# Patient Record
Sex: FEMALE | Race: WHITE | NOT HISPANIC OR LATINO | URBAN - METROPOLITAN AREA
[De-identification: names, ages, dates, MRNs, and addresses within clinical notes are randomized per-mention and may not be internally consistent; named-entity substitution may affect disease eponyms.]

---

## 2017-11-26 ENCOUNTER — INPATIENT (INPATIENT)
Facility: HOSPITAL | Age: 47
LOS: 0 days | Discharge: HOME | End: 2017-11-27
Attending: INTERNAL MEDICINE | Admitting: INTERNAL MEDICINE

## 2017-11-26 DIAGNOSIS — M46.1 SACROILIITIS, NOT ELSEWHERE CLASSIFIED: ICD-10-CM

## 2017-11-26 DIAGNOSIS — R07.9 CHEST PAIN, UNSPECIFIED: ICD-10-CM

## 2017-11-26 DIAGNOSIS — R19.7 DIARRHEA, UNSPECIFIED: ICD-10-CM

## 2017-11-26 DIAGNOSIS — I10 ESSENTIAL (PRIMARY) HYPERTENSION: ICD-10-CM

## 2017-11-26 DIAGNOSIS — K57.32 DIVERTICULITIS OF LARGE INTESTINE WITHOUT PERFORATION OR ABSCESS WITHOUT BLEEDING: ICD-10-CM

## 2017-12-01 DIAGNOSIS — M54.9 DORSALGIA, UNSPECIFIED: ICD-10-CM

## 2017-12-01 DIAGNOSIS — H40.9 UNSPECIFIED GLAUCOMA: ICD-10-CM

## 2017-12-01 DIAGNOSIS — R07.9 CHEST PAIN, UNSPECIFIED: ICD-10-CM

## 2017-12-01 DIAGNOSIS — G89.29 OTHER CHRONIC PAIN: ICD-10-CM

## 2017-12-01 DIAGNOSIS — I10 ESSENTIAL (PRIMARY) HYPERTENSION: ICD-10-CM

## 2017-12-01 DIAGNOSIS — R07.89 OTHER CHEST PAIN: ICD-10-CM

## 2017-12-01 DIAGNOSIS — G35 MULTIPLE SCLEROSIS: ICD-10-CM

## 2018-08-02 ENCOUNTER — EMERGENCY (EMERGENCY)
Facility: HOSPITAL | Age: 48
LOS: 0 days | Discharge: HOME | End: 2018-08-02
Attending: EMERGENCY MEDICINE | Admitting: EMERGENCY MEDICINE

## 2018-08-02 VITALS
RESPIRATION RATE: 18 BRPM | TEMPERATURE: 98 F | SYSTOLIC BLOOD PRESSURE: 132 MMHG | OXYGEN SATURATION: 97 % | DIASTOLIC BLOOD PRESSURE: 70 MMHG | HEART RATE: 82 BPM

## 2018-08-02 VITALS
WEIGHT: 184.97 LBS | HEIGHT: 65 IN | OXYGEN SATURATION: 96 % | RESPIRATION RATE: 19 BRPM | HEART RATE: 74 BPM | SYSTOLIC BLOOD PRESSURE: 149 MMHG | DIASTOLIC BLOOD PRESSURE: 79 MMHG | TEMPERATURE: 97 F

## 2018-08-02 DIAGNOSIS — Z98.1 ARTHRODESIS STATUS: ICD-10-CM

## 2018-08-02 DIAGNOSIS — M25.552 PAIN IN LEFT HIP: ICD-10-CM

## 2018-08-02 DIAGNOSIS — M25.559 PAIN IN UNSPECIFIED HIP: ICD-10-CM

## 2018-08-02 DIAGNOSIS — G89.29 OTHER CHRONIC PAIN: ICD-10-CM

## 2018-08-02 DIAGNOSIS — M53.3 SACROCOCCYGEAL DISORDERS, NOT ELSEWHERE CLASSIFIED: ICD-10-CM

## 2018-08-02 DIAGNOSIS — Z88.6 ALLERGY STATUS TO ANALGESIC AGENT: ICD-10-CM

## 2018-08-02 DIAGNOSIS — Z86.69 PERSONAL HISTORY OF OTHER DISEASES OF THE NERVOUS SYSTEM AND SENSE ORGANS: ICD-10-CM

## 2018-08-02 DIAGNOSIS — Z88.2 ALLERGY STATUS TO SULFONAMIDES: ICD-10-CM

## 2018-08-02 DIAGNOSIS — Z79.899 OTHER LONG TERM (CURRENT) DRUG THERAPY: ICD-10-CM

## 2018-08-02 DIAGNOSIS — I10 ESSENTIAL (PRIMARY) HYPERTENSION: ICD-10-CM

## 2018-08-02 RX ORDER — ACETAMINOPHEN 500 MG
975 TABLET ORAL ONCE
Qty: 0 | Refills: 0 | Status: COMPLETED | OUTPATIENT
Start: 2018-08-02 | End: 2018-08-02

## 2018-08-02 NOTE — ED PROVIDER NOTE - PHYSICAL EXAMINATION
CONSTITUTIONAL: Well-developed; well-nourished; in no acute distress.   SKIN: warm, dry  CARD: S1, S2 normal; no murmurs, gallops, or rubs. Regular rate and rhythm.   RESP: No wheezes, rales or rhonchi.  ABD: soft ntnd  EXT: +tenderness to left SI joint, limited ROM at hip due to pain   NEURO: Alert, oriented, grossly unremarkable; neurovascularly intact   PSYCH: Cooperative, appropriate.

## 2018-08-02 NOTE — ED PROVIDER NOTE - ATTENDING CONTRIBUTION TO CARE
47 yo F with history of HTN, MD, spinal and SI joint fusion here for gradual worsening of chronic L hip and back pain. No new weakness, pareshtesias, urinary or stool sx.    Has unremarkable exam without signs of acute cord or midline injury, no focal neuro deficits -- will get XR and reassess.

## 2018-08-02 NOTE — ED PROVIDER NOTE - NS ED ROS FT
Constitutional: See HPI.  Eyes: No visual changes, eye pain or discharge.  ENMT: No hearing changes, pain, discharge or infections. No neck pain or stiffness.  Cardiac: No chest pain, SOB or edema. No chest pain with exertion.  Respiratory: No cough or respiratory distress.   MS: +left hip pain; No myalgia, muscle weakness.  Neuro: No headache or weakness. No LOC.  Skin: No skin rash.

## 2018-08-02 NOTE — ED ADULT TRIAGE NOTE - CHIEF COMPLAINT QUOTE
patient complaining of pain in her left hip, S/P S-I fusion 4 years ago, patient denies any recent injuries to area. pain began intermittently since "last couple of years" but worsened today

## 2018-08-02 NOTE — ED PROVIDER NOTE - OBJECTIVE STATEMENT
47 y/o female with pmhx of HTN, MS, SI fusion presents with left hip pain. Patient states she had SI fusion surgery 3 years ago; has chronic back pain and hip pain however for the past couple months the pain has been worsening. Patient states today, she was at the grocery store, placing something on the counter when she had sharp pain to left hip, able to ambulate but with difficulty due to pain. Denies trauma, numbness/tingling, paresthesias, weakness. Orthopedist is at [facility].

## 2018-08-02 NOTE — ED PROVIDER NOTE - MEDICAL DECISION MAKING DETAILS
see attending note, harware intact, unchanged from previous, will need MRI as outpatient, advised on results and PMD follow up

## 2019-11-17 ENCOUNTER — EMERGENCY (EMERGENCY)
Facility: HOSPITAL | Age: 49
LOS: 0 days | Discharge: HOME | End: 2019-11-18
Attending: EMERGENCY MEDICINE | Admitting: EMERGENCY MEDICINE
Payer: COMMERCIAL

## 2019-11-17 VITALS
WEIGHT: 184.97 LBS | HEART RATE: 103 BPM | DIASTOLIC BLOOD PRESSURE: 78 MMHG | HEIGHT: 65 IN | OXYGEN SATURATION: 97 % | TEMPERATURE: 97 F | RESPIRATION RATE: 20 BRPM | SYSTOLIC BLOOD PRESSURE: 151 MMHG

## 2019-11-17 DIAGNOSIS — R23.2 FLUSHING: ICD-10-CM

## 2019-11-17 DIAGNOSIS — Z88.5 ALLERGY STATUS TO NARCOTIC AGENT: ICD-10-CM

## 2019-11-17 DIAGNOSIS — R00.2 PALPITATIONS: ICD-10-CM

## 2019-11-17 DIAGNOSIS — Z88.2 ALLERGY STATUS TO SULFONAMIDES: ICD-10-CM

## 2019-11-17 DIAGNOSIS — E83.42 HYPOMAGNESEMIA: ICD-10-CM

## 2019-11-17 DIAGNOSIS — I10 ESSENTIAL (PRIMARY) HYPERTENSION: ICD-10-CM

## 2019-11-17 DIAGNOSIS — T40.2X5A ADVERSE EFFECT OF OTHER OPIOIDS, INITIAL ENCOUNTER: ICD-10-CM

## 2019-11-17 PROCEDURE — 99285 EMERGENCY DEPT VISIT HI MDM: CPT

## 2019-11-17 RX ORDER — SODIUM CHLORIDE 9 MG/ML
1000 INJECTION INTRAMUSCULAR; INTRAVENOUS; SUBCUTANEOUS ONCE
Refills: 0 | Status: COMPLETED | OUTPATIENT
Start: 2019-11-17 | End: 2019-11-17

## 2019-11-17 NOTE — ED ADULT TRIAGE NOTE - CHIEF COMPLAINT QUOTE
Pt states she took 2 of Nucynta with in a 2 period hour and states she does not "feel right".  Face is flushed and c/o, dry mouth and nausea.

## 2019-11-18 VITALS
RESPIRATION RATE: 17 BRPM | DIASTOLIC BLOOD PRESSURE: 85 MMHG | SYSTOLIC BLOOD PRESSURE: 134 MMHG | HEART RATE: 96 BPM | OXYGEN SATURATION: 98 %

## 2019-11-18 PROBLEM — I10 ESSENTIAL (PRIMARY) HYPERTENSION: Chronic | Status: ACTIVE | Noted: 2018-08-02

## 2019-11-18 PROBLEM — G35 MULTIPLE SCLEROSIS: Chronic | Status: ACTIVE | Noted: 2018-08-02

## 2019-11-18 LAB
ALBUMIN SERPL ELPH-MCNC: 4.5 G/DL — SIGNIFICANT CHANGE UP (ref 3.5–5.2)
ALP SERPL-CCNC: 58 U/L — SIGNIFICANT CHANGE UP (ref 30–115)
ALT FLD-CCNC: 15 U/L — SIGNIFICANT CHANGE UP (ref 0–41)
ANION GAP SERPL CALC-SCNC: 12 MMOL/L — SIGNIFICANT CHANGE UP (ref 7–14)
AST SERPL-CCNC: 38 U/L — SIGNIFICANT CHANGE UP (ref 0–41)
BASOPHILS # BLD AUTO: 0.04 K/UL — SIGNIFICANT CHANGE UP (ref 0–0.2)
BASOPHILS NFR BLD AUTO: 0.4 % — SIGNIFICANT CHANGE UP (ref 0–1)
BILIRUB SERPL-MCNC: 0.7 MG/DL — SIGNIFICANT CHANGE UP (ref 0.2–1.2)
BUN SERPL-MCNC: 9 MG/DL — LOW (ref 10–20)
CALCIUM SERPL-MCNC: 9.7 MG/DL — SIGNIFICANT CHANGE UP (ref 8.5–10.1)
CHLORIDE SERPL-SCNC: 98 MMOL/L — SIGNIFICANT CHANGE UP (ref 98–110)
CO2 SERPL-SCNC: 23 MMOL/L — SIGNIFICANT CHANGE UP (ref 17–32)
CREAT SERPL-MCNC: 0.6 MG/DL — LOW (ref 0.7–1.5)
EOSINOPHIL # BLD AUTO: 0.03 K/UL — SIGNIFICANT CHANGE UP (ref 0–0.7)
EOSINOPHIL NFR BLD AUTO: 0.3 % — SIGNIFICANT CHANGE UP (ref 0–8)
GLUCOSE SERPL-MCNC: 154 MG/DL — HIGH (ref 70–99)
HCG SERPL QL: NEGATIVE — SIGNIFICANT CHANGE UP
HCT VFR BLD CALC: 44.6 % — SIGNIFICANT CHANGE UP (ref 37–47)
HGB BLD-MCNC: 15.4 G/DL — SIGNIFICANT CHANGE UP (ref 12–16)
IMM GRANULOCYTES NFR BLD AUTO: 0.5 % — HIGH (ref 0.1–0.3)
LYMPHOCYTES # BLD AUTO: 1.69 K/UL — SIGNIFICANT CHANGE UP (ref 1.2–3.4)
LYMPHOCYTES # BLD AUTO: 15.7 % — LOW (ref 20.5–51.1)
MAGNESIUM SERPL-MCNC: 1.7 MG/DL — LOW (ref 1.8–2.4)
MCHC RBC-ENTMCNC: 30.6 PG — SIGNIFICANT CHANGE UP (ref 27–31)
MCHC RBC-ENTMCNC: 34.5 G/DL — SIGNIFICANT CHANGE UP (ref 32–37)
MCV RBC AUTO: 88.7 FL — SIGNIFICANT CHANGE UP (ref 81–99)
MONOCYTES # BLD AUTO: 0.74 K/UL — HIGH (ref 0.1–0.6)
MONOCYTES NFR BLD AUTO: 6.9 % — SIGNIFICANT CHANGE UP (ref 1.7–9.3)
NEUTROPHILS # BLD AUTO: 8.24 K/UL — HIGH (ref 1.4–6.5)
NEUTROPHILS NFR BLD AUTO: 76.2 % — HIGH (ref 42.2–75.2)
NRBC # BLD: 0 /100 WBCS — SIGNIFICANT CHANGE UP (ref 0–0)
NT-PROBNP SERPL-SCNC: 16 PG/ML — SIGNIFICANT CHANGE UP (ref 0–300)
PLATELET # BLD AUTO: 294 K/UL — SIGNIFICANT CHANGE UP (ref 130–400)
POTASSIUM SERPL-MCNC: 5.4 MMOL/L — HIGH (ref 3.5–5)
POTASSIUM SERPL-SCNC: 5.4 MMOL/L — HIGH (ref 3.5–5)
PROT SERPL-MCNC: 7.6 G/DL — SIGNIFICANT CHANGE UP (ref 6–8)
RBC # BLD: 5.03 M/UL — SIGNIFICANT CHANGE UP (ref 4.2–5.4)
RBC # FLD: 13.8 % — SIGNIFICANT CHANGE UP (ref 11.5–14.5)
SODIUM SERPL-SCNC: 133 MMOL/L — LOW (ref 135–146)
TROPONIN T SERPL-MCNC: <0.01 NG/ML — SIGNIFICANT CHANGE UP
WBC # BLD: 10.79 K/UL — SIGNIFICANT CHANGE UP (ref 4.8–10.8)
WBC # FLD AUTO: 10.79 K/UL — SIGNIFICANT CHANGE UP (ref 4.8–10.8)

## 2019-11-18 PROCEDURE — 71046 X-RAY EXAM CHEST 2 VIEWS: CPT | Mod: 26

## 2019-11-18 NOTE — ED PROVIDER NOTE - OBJECTIVE STATEMENT
50 y/o F with PMH MS, back surgery on Nucynta, presents with flushing/palpitations s/p taking a double dose of Nucynta, which she has never done before. no cp/sob. no palliating/provoking factors. has been on this med x 3 years.

## 2019-11-18 NOTE — ED PROVIDER NOTE - NSFOLLOWUPINSTRUCTIONS_ED_ALL_ED_FT
**** YOU HAVE LOW MAGNESIUM--- EAT FOODS LIKE BEANS, LEAFY GREENS, NUTS, SEEDS AND WHOLE GRAINS TO HELP BOOST YOUR MAGNESIUM LEVEL********         Drug Allergy  A drug allergy is when your body reacts in a bad way to a medicine. The reaction may be mild or very bad. In some cases, it can be life-threatening.  If you have an allergic reaction, get help right away. You should get help even if the reaction seems mild.  What are the causes?  This condition is caused by a reaction in your body's defense system (immune system). The system sees a medicine as being harmful when it is not.  What are the signs or symptoms?  Symptoms of a mild reaction     A stuffy nose (nasal congestion).Tingling in your mouth.An itchy, red rash.Symptoms of a very bad reaction     Swelling of your eyes, lips, face, or tongue.Swelling of the back of your mouth and your throat.Breathing loudly (wheezing).A hoarse voice.Itchy, red, swollen areas of skin (hives).Feeling dizzy or light-headed.Passing out (fainting).Feeling worried or nervous (anxiety).Feeling mixed up (confused).Pain in your belly (abdomen).Trouble with breathing, talking, or swallowing.A tight feeling in your chest.Fast or uneven heartbeats (palpitations).Throwing up (vomiting).Watery poop (diarrhea).How is this treated?  There is no cure for allergies. An allergic reaction can be treated with:  Medicines to help your symptoms.Medicines that you breathe into your lungs (respiratory inhalers).An allergy shot (epinephrine injection).For a very bad reaction, you may need to stay in the hospital. Your doctor may teach you how to use an allergy kit (anaphylaxis kit) and how to give yourself an allergy shot. You can give yourself an allergy shot with what is called an auto-injector "pen."  Follow these instructions at home:  If you have a very bad allergy:     Image   Always keep an auto-injector pen or your allergy kit with you. These could save your life. Use them as told by your doctor.Make sure that you, the people who live with you, and your employer know:  How to use your allergy kit.How to use an auto-injector pen to give you an allergy shot.If you used your auto-injector pen:  Get more medicine for it right away. This is important in case you have another reaction.Get help right away.Wear a medical alert bracelet or necklace that says you have an allergy, if your doctor tells you to do this.General instructions     Avoid medicines that you are allergic to.Take over-the-counter and prescription medicines only as told by your doctor.Do not drive until your doctor says it is safe.If you have itchy, red, swollen areas of skin or a rash:  Use an over-the-counter medicine (antihistamine) as told by your doctor.Put cold, wet cloths (cold compresses) on your skin.Take baths or showers in cool water. Avoid hot water.If you had tests done, it is up to you to get your test results. Ask your doctor when your results will be ready.Tell any doctors who care for you that you have a drug allergy.Keep all follow-up visits as told by your doctor. This is important.Contact a doctor if:  You think that you are having a mild allergic reaction.You have symptoms that last more than 2 days after your reaction.Your symptoms get worse.You get new symptoms.Get help right away if:  You had to use your auto-injector pen. You must go to the emergency room, even if the medicine seems to be working.You have symptoms of a very bad allergic reaction.These symptoms may be an emergency. Do not wait to see if the symptoms will go away. Use your auto-injector pen or allergy kit as you have been told. Get medical help right away. Call your local emergency services (911 in the [place] Do not drive yourself to the hospital.   Summary  A drug allergy is when your body reacts in a bad way to a medicine.Take medicines only as told by your doctor.Tell any doctors who care for you that you have a drug allergy.Always keep an auto-injector pen or your allergy kit with you if you have a very bad allergy.This information is not intended to replace advice given to you by your health care provider. Make sure you discuss any questions you have with your health care provider.

## 2019-11-18 NOTE — ED PROVIDER NOTE - PATIENT PORTAL LINK FT
You can access the FollowMyHealth Patient Portal offered by [facility] by registering at the following website: [email or web address]. By joining [facility]’s FollowMyHealth portal, you will also be able to view your health information using other applications (apps) compatible with our system.

## 2019-11-18 NOTE — ED PROVIDER NOTE - ATTENDING CONTRIBUTION TO CARE
49 y F PMH MS, prior back surgery previously with adverse reactions to other PO opiates and now on Nucynta, pw flushing, palpitations, and lower anterior neck discomfort, all starting approx 1 hour PTA, after taking 2 tablets of the Nucynta close together. Has never done this before.   Exam: NAD, NCAT, HEENT: mmm, EOMI, PERRLA, Neck: supple, nontender, nl ROM, Heart: RRR, no murmur, Lungs: BCTA, no signs of increased WOB, Abd: NTND, no guarding or rebound, no hernia palpated, no CVAT. Skin: No urticaria, mild diffuse facial and neck flushing as compared to baseline per patient. MSK: chest, back, and ext nontender, nl rom, no deformity. Neuro: A&Ox3, CN II-XII intact, normal strength 5/5 all aspects of b/l UE and LE, no drift of b/l UE and LE, nl sensation throughout all 4 ext, normal speech, gait, and coordination.   A/P: Likely medication adverse reaction, symptoms listed among known adverse reactions. Eval ACS given location of discomfort and palpitations. No sign of upper airway obstruction or increased WOB to suggest allergic reaction.

## 2019-11-18 NOTE — ED PROVIDER NOTE - NS ED ROS FT
Review of Systems    Constitutional: (-) fever   Eyes/ENT: (-) vision changes  Cardiovascular: (-) chest pain, (-) syncope (+) palpitations  Respiratory: (-) cough, (-) shortness of breath  Gastrointestinal: (-) vomiting, (-) diarrhea  (-) abdominal pain  Genitourinary:  (-) dysuria   Musculoskeletal: (-) neck pain, (-) back pain, (-) leg pain/swelling  Integumentary: (-) rash, (-) edema  Neurological: (-) headache  Hematologic: (-) easy bruising   Allergic/Immunologic: (-) pruritus

## 2019-11-18 NOTE — ED PROVIDER NOTE - PHYSICAL EXAMINATION
PHYSICAL EXAM:    GENERAL: Alert, appears stated age, well appearing, non-toxic  SKIN: Warm, pink and dry. MMM. +cheeks flushed    EYE: Normal lids/conjunctiva  ENT: Normal hearing, patent oropharynx   NECK: +supple. No meningismus, or JVDffs.   Pulm: Bilateral BS, normal resp effort, no wheezes, stridor, or retractions  CV: RRR, no M/R/G, 2+and = radial pulses  Abd: soft, non-tender, non-distended  Mskel: no erythema, cyanosis, edema. no calf tenderness  Neuro: AAOx3, 5/5 strength throughout. normal gait.

## 2020-01-21 PROBLEM — Z00.00 ENCOUNTER FOR PREVENTIVE HEALTH EXAMINATION: Status: ACTIVE | Noted: 2020-01-21

## 2020-01-22 ENCOUNTER — APPOINTMENT (OUTPATIENT)
Dept: NEUROSURGERY | Facility: CLINIC | Age: 50
End: 2020-01-22
Payer: COMMERCIAL

## 2020-01-22 VITALS — WEIGHT: 180 LBS | HEIGHT: 65 IN | BODY MASS INDEX: 29.99 KG/M2

## 2020-01-22 DIAGNOSIS — Z83.3 FAMILY HISTORY OF DIABETES MELLITUS: ICD-10-CM

## 2020-01-22 DIAGNOSIS — Z86.69 PERSONAL HISTORY OF OTHER DISEASES OF THE NERVOUS SYSTEM AND SENSE ORGANS: ICD-10-CM

## 2020-01-22 DIAGNOSIS — Z78.9 OTHER SPECIFIED HEALTH STATUS: ICD-10-CM

## 2020-01-22 DIAGNOSIS — Z82.49 FAMILY HISTORY OF ISCHEMIC HEART DISEASE AND OTHER DISEASES OF THE CIRCULATORY SYSTEM: ICD-10-CM

## 2020-01-22 DIAGNOSIS — Z86.79 PERSONAL HISTORY OF OTHER DISEASES OF THE CIRCULATORY SYSTEM: ICD-10-CM

## 2020-01-22 PROCEDURE — 99204 OFFICE O/P NEW MOD 45 MIN: CPT

## 2020-01-22 RX ORDER — NIFEDIPINE 60 MG
60 TABLET, EXTENDED RELEASE 24 HR ORAL
Refills: 0 | Status: ACTIVE | COMMUNITY

## 2020-01-22 RX ORDER — METOPROLOL TARTRATE 50 MG/1
50 TABLET, FILM COATED ORAL
Refills: 0 | Status: ACTIVE | COMMUNITY

## 2020-01-22 RX ORDER — LISINOPRIL 30 MG/1
TABLET ORAL
Refills: 0 | Status: ACTIVE | COMMUNITY

## 2020-01-22 RX ORDER — TAPENTADOL HYDROCHLORIDE 100 MG/1
100 TABLET, FILM COATED ORAL
Refills: 0 | Status: ACTIVE | COMMUNITY

## 2020-01-22 RX ORDER — BIMATOPROST 0.1 MG/ML
SOLUTION/ DROPS OPHTHALMIC
Refills: 0 | Status: ACTIVE | COMMUNITY

## 2020-01-22 NOTE — REVIEW OF SYSTEMS
[Difficulty Writing] : difficulty writing [Tingling] : tingling [Leg Weakness] : leg weakness [Negative] : Endocrine

## 2020-01-27 NOTE — HISTORY OF PRESENT ILLNESS
[de-identified] : Ms. Chung has a history of chronic pain. She had a lumbar discectomy in the past. This was followed by lumbar fusion and then extension of her fusion. She also had a left SI joint fusion as well. Due to chronic lower back and left leg pain she has been medically managed on Nucynta. Recently she had a spinal cord stimulator trial with Dr. Davis using a [employer] stimulator. She notes 70% pain relief during the trial. She states she was able to discontinue the Nucynta during the trial as well. At this point she would like to proceed with permanent placement. On a side note she also has left hip pain. She was diagnoses with bursitis years ago. Injections in the past had helped her. She is scheduled to see Dr. Daisy Saxena at the end of the month to discuss her hip findings. She also has chronic weakness in the left leg. She denies bowel / bladder dysfunction.

## 2020-02-06 ENCOUNTER — OUTPATIENT (OUTPATIENT)
Dept: OUTPATIENT SERVICES | Facility: HOSPITAL | Age: 50
LOS: 1 days | Discharge: HOME | End: 2020-02-06
Payer: COMMERCIAL

## 2020-02-06 VITALS
HEIGHT: 65 IN | WEIGHT: 185.19 LBS | TEMPERATURE: 97 F | SYSTOLIC BLOOD PRESSURE: 168 MMHG | DIASTOLIC BLOOD PRESSURE: 96 MMHG | RESPIRATION RATE: 16 BRPM | OXYGEN SATURATION: 97 % | HEART RATE: 70 BPM

## 2020-02-06 DIAGNOSIS — G89.29 OTHER CHRONIC PAIN: ICD-10-CM

## 2020-02-06 DIAGNOSIS — Z01.818 ENCOUNTER FOR OTHER PREPROCEDURAL EXAMINATION: ICD-10-CM

## 2020-02-06 DIAGNOSIS — Z98.891 HISTORY OF UTERINE SCAR FROM PREVIOUS SURGERY: Chronic | ICD-10-CM

## 2020-02-06 DIAGNOSIS — L05.91 PILONIDAL CYST WITHOUT ABSCESS: Chronic | ICD-10-CM

## 2020-02-06 DIAGNOSIS — Z98.1 ARTHRODESIS STATUS: Chronic | ICD-10-CM

## 2020-02-06 DIAGNOSIS — Z98.890 OTHER SPECIFIED POSTPROCEDURAL STATES: Chronic | ICD-10-CM

## 2020-02-06 DIAGNOSIS — Z90.89 ACQUIRED ABSENCE OF OTHER ORGANS: Chronic | ICD-10-CM

## 2020-02-06 LAB
ALBUMIN SERPL ELPH-MCNC: 4.6 G/DL — SIGNIFICANT CHANGE UP (ref 3.5–5.2)
ALP SERPL-CCNC: 67 U/L — SIGNIFICANT CHANGE UP (ref 30–115)
ALT FLD-CCNC: 17 U/L — SIGNIFICANT CHANGE UP (ref 0–41)
ANION GAP SERPL CALC-SCNC: 15 MMOL/L — HIGH (ref 7–14)
APPEARANCE UR: CLEAR — SIGNIFICANT CHANGE UP
APTT BLD: 31.1 SEC — SIGNIFICANT CHANGE UP (ref 27–39.2)
AST SERPL-CCNC: 16 U/L — SIGNIFICANT CHANGE UP (ref 0–41)
BACTERIA # UR AUTO: NEGATIVE — SIGNIFICANT CHANGE UP
BASOPHILS # BLD AUTO: 0.06 K/UL — SIGNIFICANT CHANGE UP (ref 0–0.2)
BASOPHILS NFR BLD AUTO: 0.7 % — SIGNIFICANT CHANGE UP (ref 0–1)
BILIRUB SERPL-MCNC: 0.5 MG/DL — SIGNIFICANT CHANGE UP (ref 0.2–1.2)
BILIRUB UR-MCNC: NEGATIVE — SIGNIFICANT CHANGE UP
BLD GP AB SCN SERPL QL: SIGNIFICANT CHANGE UP
BUN SERPL-MCNC: 7 MG/DL — LOW (ref 10–20)
CALCIUM SERPL-MCNC: 9.5 MG/DL — SIGNIFICANT CHANGE UP (ref 8.5–10.1)
CHLORIDE SERPL-SCNC: 101 MMOL/L — SIGNIFICANT CHANGE UP (ref 98–110)
CO2 SERPL-SCNC: 24 MMOL/L — SIGNIFICANT CHANGE UP (ref 17–32)
COLOR SPEC: YELLOW — SIGNIFICANT CHANGE UP
CREAT SERPL-MCNC: 0.6 MG/DL — LOW (ref 0.7–1.5)
DIFF PNL FLD: NEGATIVE — SIGNIFICANT CHANGE UP
EOSINOPHIL # BLD AUTO: 0.07 K/UL — SIGNIFICANT CHANGE UP (ref 0–0.7)
EOSINOPHIL NFR BLD AUTO: 0.8 % — SIGNIFICANT CHANGE UP (ref 0–8)
EPI CELLS # UR: 3 /HPF — SIGNIFICANT CHANGE UP (ref 0–5)
GLUCOSE SERPL-MCNC: 91 MG/DL — SIGNIFICANT CHANGE UP (ref 70–99)
GLUCOSE UR QL: NEGATIVE — SIGNIFICANT CHANGE UP
HCT VFR BLD CALC: 44.9 % — SIGNIFICANT CHANGE UP (ref 37–47)
HGB BLD-MCNC: 15.5 G/DL — SIGNIFICANT CHANGE UP (ref 12–16)
HYALINE CASTS # UR AUTO: 4 /LPF — SIGNIFICANT CHANGE UP (ref 0–7)
IMM GRANULOCYTES NFR BLD AUTO: 0.3 % — SIGNIFICANT CHANGE UP (ref 0.1–0.3)
INR BLD: 1.03 RATIO — SIGNIFICANT CHANGE UP (ref 0.65–1.3)
KETONES UR-MCNC: NEGATIVE — SIGNIFICANT CHANGE UP
LEUKOCYTE ESTERASE UR-ACNC: ABNORMAL
LYMPHOCYTES # BLD AUTO: 1.91 K/UL — SIGNIFICANT CHANGE UP (ref 1.2–3.4)
LYMPHOCYTES # BLD AUTO: 20.7 % — SIGNIFICANT CHANGE UP (ref 20.5–51.1)
MCHC RBC-ENTMCNC: 31.3 PG — HIGH (ref 27–31)
MCHC RBC-ENTMCNC: 34.5 G/DL — SIGNIFICANT CHANGE UP (ref 32–37)
MCV RBC AUTO: 90.7 FL — SIGNIFICANT CHANGE UP (ref 81–99)
MONOCYTES # BLD AUTO: 0.95 K/UL — HIGH (ref 0.1–0.6)
MONOCYTES NFR BLD AUTO: 10.3 % — HIGH (ref 1.7–9.3)
NEUTROPHILS # BLD AUTO: 6.19 K/UL — SIGNIFICANT CHANGE UP (ref 1.4–6.5)
NEUTROPHILS NFR BLD AUTO: 67.2 % — SIGNIFICANT CHANGE UP (ref 42.2–75.2)
NITRITE UR-MCNC: NEGATIVE — SIGNIFICANT CHANGE UP
NRBC # BLD: 0 /100 WBCS — SIGNIFICANT CHANGE UP (ref 0–0)
PH UR: 6 — SIGNIFICANT CHANGE UP (ref 5–8)
PLATELET # BLD AUTO: 385 K/UL — SIGNIFICANT CHANGE UP (ref 130–400)
POTASSIUM SERPL-MCNC: 4.7 MMOL/L — SIGNIFICANT CHANGE UP (ref 3.5–5)
POTASSIUM SERPL-SCNC: 4.7 MMOL/L — SIGNIFICANT CHANGE UP (ref 3.5–5)
PROT SERPL-MCNC: 7.5 G/DL — SIGNIFICANT CHANGE UP (ref 6–8)
PROT UR-MCNC: SIGNIFICANT CHANGE UP
PROTHROM AB SERPL-ACNC: 11.9 SEC — SIGNIFICANT CHANGE UP (ref 9.95–12.87)
RBC # BLD: 4.95 M/UL — SIGNIFICANT CHANGE UP (ref 4.2–5.4)
RBC # FLD: 12.8 % — SIGNIFICANT CHANGE UP (ref 11.5–14.5)
RBC CASTS # UR COMP ASSIST: 2 /HPF — SIGNIFICANT CHANGE UP (ref 0–4)
SODIUM SERPL-SCNC: 140 MMOL/L — SIGNIFICANT CHANGE UP (ref 135–146)
SP GR SPEC: 1.02 — SIGNIFICANT CHANGE UP (ref 1.01–1.02)
UROBILINOGEN FLD QL: SIGNIFICANT CHANGE UP
WBC # BLD: 9.21 K/UL — SIGNIFICANT CHANGE UP (ref 4.8–10.8)
WBC # FLD AUTO: 9.21 K/UL — SIGNIFICANT CHANGE UP (ref 4.8–10.8)
WBC UR QL: 25 /HPF — HIGH (ref 0–5)

## 2020-02-06 PROCEDURE — 93010 ELECTROCARDIOGRAM REPORT: CPT

## 2020-02-06 RX ORDER — LISINOPRIL 2.5 MG/1
0 TABLET ORAL
Qty: 0 | Refills: 0 | DISCHARGE

## 2020-02-06 RX ORDER — NIFEDIPINE 30 MG
0 TABLET, EXTENDED RELEASE 24 HR ORAL
Qty: 0 | Refills: 0 | DISCHARGE

## 2020-02-06 RX ORDER — METOPROLOL TARTRATE 50 MG
0 TABLET ORAL
Qty: 0 | Refills: 0 | DISCHARGE
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
## 2020-02-06 NOTE — H&P PST ADULT - NSICDXPASTSURGICALHX_GEN_ALL_CORE_FT
PAST SURGICAL HISTORY:  H/O  section     History of lumbar fusion 2017, 2016 and ,     Pilonidal cyst     S/P endometrial ablation TWICE, 10 YRS AGO    S/P tonsillectomy

## 2020-02-06 NOTE — H&P PST ADULT - REASON FOR ADMISSION
48 yo here for PAST for Placement of Spinal Cord Stimulator with Thoracic Laminectomy Thoracic Nine ([employer] , Prone, Dioni Frame, Drill, C-arm, SSEP, MEP)

## 2020-02-20 ENCOUNTER — OUTPATIENT (OUTPATIENT)
Dept: OUTPATIENT SERVICES | Facility: HOSPITAL | Age: 50
LOS: 1 days | Discharge: HOME | End: 2020-02-20
Payer: COMMERCIAL

## 2020-02-20 ENCOUNTER — APPOINTMENT (OUTPATIENT)
Dept: NEUROSURGERY | Facility: HOSPITAL | Age: 50
End: 2020-02-20
Payer: COMMERCIAL

## 2020-02-20 VITALS — DIASTOLIC BLOOD PRESSURE: 86 MMHG | RESPIRATION RATE: 18 BRPM | HEART RATE: 73 BPM | SYSTOLIC BLOOD PRESSURE: 152 MMHG

## 2020-02-20 VITALS — RESPIRATION RATE: 12 BRPM | DIASTOLIC BLOOD PRESSURE: 93 MMHG | HEART RATE: 65 BPM | SYSTOLIC BLOOD PRESSURE: 161 MMHG

## 2020-02-20 DIAGNOSIS — L05.91 PILONIDAL CYST WITHOUT ABSCESS: Chronic | ICD-10-CM

## 2020-02-20 DIAGNOSIS — Z88.5 ALLERGY STATUS TO NARCOTIC AGENT: ICD-10-CM

## 2020-02-20 DIAGNOSIS — Z90.89 ACQUIRED ABSENCE OF OTHER ORGANS: Chronic | ICD-10-CM

## 2020-02-20 DIAGNOSIS — Z98.891 HISTORY OF UTERINE SCAR FROM PREVIOUS SURGERY: Chronic | ICD-10-CM

## 2020-02-20 DIAGNOSIS — Z98.1 ARTHRODESIS STATUS: Chronic | ICD-10-CM

## 2020-02-20 DIAGNOSIS — Z88.2 ALLERGY STATUS TO SULFONAMIDES: ICD-10-CM

## 2020-02-20 DIAGNOSIS — Z98.890 OTHER SPECIFIED POSTPROCEDURAL STATES: Chronic | ICD-10-CM

## 2020-02-20 PROCEDURE — 95972 ALYS CPLX SP/PN NPGT W/PRGRM: CPT | Mod: 59

## 2020-02-20 PROCEDURE — 63655 IMPLANT NEUROELECTRODES: CPT

## 2020-02-20 PROCEDURE — 63685 INS/RPLC SPI NPG/RCVR POCKET: CPT | Mod: 82

## 2020-02-20 PROCEDURE — 63655 IMPLANT NEUROELECTRODES: CPT | Mod: 82

## 2020-02-20 PROCEDURE — 63685 INS/RPLC SPI NPG/RCVR POCKET: CPT

## 2020-02-20 RX ORDER — TAPENTADOL HYDROCHLORIDE 50 MG/1
1 TABLET, FILM COATED ORAL
Qty: 0 | Refills: 0 | DISCHARGE

## 2020-02-20 RX ORDER — METOPROLOL TARTRATE 50 MG
50 TABLET ORAL
Qty: 0 | Refills: 0 | DISCHARGE

## 2020-02-20 RX ORDER — SODIUM CHLORIDE 9 MG/ML
1000 INJECTION, SOLUTION INTRAVENOUS
Refills: 0 | Status: DISCONTINUED | OUTPATIENT
Start: 2020-02-20 | End: 2020-02-21

## 2020-02-20 RX ORDER — NIFEDIPINE 30 MG
90 TABLET, EXTENDED RELEASE 24 HR ORAL
Qty: 0 | Refills: 0 | DISCHARGE

## 2020-02-20 RX ORDER — MEPERIDINE HYDROCHLORIDE 50 MG/ML
12.5 INJECTION INTRAMUSCULAR; INTRAVENOUS; SUBCUTANEOUS
Refills: 0 | Status: DISCONTINUED | OUTPATIENT
Start: 2020-02-20 | End: 2020-02-21

## 2020-02-20 RX ORDER — LISINOPRIL 2.5 MG/1
40 TABLET ORAL
Qty: 0 | Refills: 0 | DISCHARGE

## 2020-02-20 RX ORDER — MORPHINE SULFATE 50 MG/1
4 CAPSULE, EXTENDED RELEASE ORAL
Refills: 0 | Status: DISCONTINUED | OUTPATIENT
Start: 2020-02-20 | End: 2020-02-21

## 2020-02-20 RX ORDER — GABAPENTIN 400 MG/1
1 CAPSULE ORAL
Qty: 0 | Refills: 0 | DISCHARGE

## 2020-02-20 RX ADMIN — MEPERIDINE HYDROCHLORIDE 12.5 MILLIGRAM(S): 50 INJECTION INTRAMUSCULAR; INTRAVENOUS; SUBCUTANEOUS at 14:00

## 2020-02-20 RX ADMIN — MEPERIDINE HYDROCHLORIDE 12.5 MILLIGRAM(S): 50 INJECTION INTRAMUSCULAR; INTRAVENOUS; SUBCUTANEOUS at 15:24

## 2020-02-20 RX ADMIN — SODIUM CHLORIDE 50 MILLILITER(S): 9 INJECTION, SOLUTION INTRAVENOUS at 14:02

## 2020-02-20 NOTE — ASU DISCHARGE PLAN (ADULT/PEDIATRIC) - CALL YOUR DOCTOR IF YOU HAVE ANY OF THE FOLLOWING:
Excessive diarrhea/Numbness, tingling, color or temperature change to extremity/Inability to tolerate liquids or foods/Increased irritability or sluggishness/Swelling that gets worse/Pain not relieved by Medications/Nausea and vomiting that does not stop/Fever greater than (need to indicate Fahrenheit or Celsius)/Wound/Surgical Site with redness, or foul smelling discharge or pus/Unable to urinate/Bleeding that does not stop

## 2020-02-20 NOTE — BRIEF OPERATIVE NOTE - NSICDXBRIEFPROCEDURE_GEN_ALL_CORE_FT
PROCEDURES:  Laminectomy, spine, thoracic, with spinal cord stimulator insertion 20-Feb-2020 14:14:58  Kaye Melendez

## 2020-02-20 NOTE — ASU DISCHARGE PLAN (ADULT/PEDIATRIC) - CARE PROVIDER_API CALL
Kaye Melendez)  [facility]  [address] [place] [address], [address]  [place] [address]  Phone: [phone]  Fax: [phone]  Follow Up Time:

## 2020-02-20 NOTE — ASU PATIENT PROFILE, ADULT - PSH
H/O  section    History of lumbar fusion  2017,  and 2012  Pilonidal cyst    S/P endometrial ablation  TWICE, 10 YRS AGO  S/P tonsillectomy

## 2020-02-20 NOTE — ASU DISCHARGE PLAN (ADULT/PEDIATRIC) - ACTIVITY LEVEL
Weight bearing as tolerated/until outpatient appointment/No sports/gym/Exercise/No excercise/No heavy lifting/No tub baths

## 2020-02-20 NOTE — BRIEF OPERATIVE NOTE - OPERATION/FINDINGS
T9 laminectomy placement of epidural paddle to bottom T7. right supragluteal IPG placed. complex intraoperative programming

## 2020-02-26 DIAGNOSIS — Z88.2 ALLERGY STATUS TO SULFONAMIDES: ICD-10-CM

## 2020-02-26 DIAGNOSIS — M96.1 POSTLAMINECTOMY SYNDROME, NOT ELSEWHERE CLASSIFIED: ICD-10-CM

## 2020-02-26 DIAGNOSIS — M54.16 RADICULOPATHY, LUMBAR REGION: ICD-10-CM

## 2020-02-26 DIAGNOSIS — Z88.5 ALLERGY STATUS TO NARCOTIC AGENT: ICD-10-CM

## 2020-02-26 DIAGNOSIS — Z98.1 ARTHRODESIS STATUS: ICD-10-CM

## 2020-02-26 DIAGNOSIS — G89.4 CHRONIC PAIN SYNDROME: ICD-10-CM

## 2020-02-26 DIAGNOSIS — I10 ESSENTIAL (PRIMARY) HYPERTENSION: ICD-10-CM

## 2020-02-26 DIAGNOSIS — G35 MULTIPLE SCLEROSIS: ICD-10-CM

## 2020-03-09 ENCOUNTER — APPOINTMENT (OUTPATIENT)
Dept: NEUROSURGERY | Facility: CLINIC | Age: 50
End: 2020-03-09
Payer: COMMERCIAL

## 2020-03-09 PROCEDURE — 99024 POSTOP FOLLOW-UP VISIT: CPT

## 2020-03-09 NOTE — HISTORY OF PRESENT ILLNESS
[FreeTextEntry1] : Patient presents today s/p spinal cord stimulator on 2/20/2020. Doing well. [employer] rep is here to help with programming. Removed sutures. Surgical site is clean,dry, intact, no drainage, and no erythema. She will complete the clindamycin.

## 2020-04-06 ENCOUNTER — APPOINTMENT (OUTPATIENT)
Dept: NEUROSURGERY | Facility: CLINIC | Age: 50
End: 2020-04-06

## 2020-05-11 ENCOUNTER — APPOINTMENT (OUTPATIENT)
Dept: NEUROSURGERY | Facility: CLINIC | Age: 50
End: 2020-05-11
Payer: COMMERCIAL

## 2020-05-11 PROCEDURE — 99024 POSTOP FOLLOW-UP VISIT: CPT

## 2020-05-11 NOTE — HISTORY OF PRESENT ILLNESS
[FreeTextEntry1] : Ms. Chung is s/p thoracic laminectomy for SCS syndrome. she suffers both from failed back as well as MS related chronic pain. She is doing very well with her stimulator. Incisions are well healed. It is providing tremendous relief and improvement in her functionality. She occasionally gets pain radiating under the right breast. This likely is due to the stimulator and reprogramming will likely alleviate that. Unfortunately the CST from [facility]) could not be here today. We have rescheduled her for a f/u appointment on Wednesday when he will be available for reprogramming as well as further education with the device. Overall she is very please with the relief she is obtaining. Referral for telehealth PT was also given to help her with her gait now that her pain is significantly reduced.

## 2020-05-13 ENCOUNTER — APPOINTMENT (OUTPATIENT)
Dept: NEUROSURGERY | Facility: CLINIC | Age: 50
End: 2020-05-13
Payer: COMMERCIAL

## 2020-05-13 PROCEDURE — 99024 POSTOP FOLLOW-UP VISIT: CPT

## 2020-05-18 NOTE — HISTORY OF PRESENT ILLNESS
[FreeTextEntry1] : s/p SCS placement for failed lumbar laminectomy, MS. Pt with significant relief. SCS reprogramming done in office today. Education on usage given. Pt feels great with device. Very happy.

## 2020-05-18 NOTE — ASSESSMENT
[FreeTextEntry1] : Pt doing well post thoracic lami SCS placement. Very good results. Reprogrammed. Will see her back in f/u in 4-6 weeks.

## 2020-06-23 NOTE — ASU DISCHARGE PLAN (ADULT/PEDIATRIC) - MODE OF TRANSPORTATION
Ambulatory Complex Repair And Rhombic Flap Text: The defect edges were debeveled with a #15 scalpel blade.  The primary defect was closed partially with a complex linear closure.  Given the location of the remaining defect, shape of the defect and the proximity to free margins a rhombic flap was deemed most appropriate for complete closure of the defect.  Using a sterile surgical marker, an appropriate advancement flap was drawn incorporating the defect and placing the expected incisions within the relaxed skin tension lines where possible.    The area thus outlined was incised deep to adipose tissue with a #15 scalpel blade.  The skin margins were undermined to an appropriate distance in all directions utilizing iris scissors.

## 2021-03-24 ENCOUNTER — NON-APPOINTMENT (OUTPATIENT)
Age: 51
End: 2021-03-24

## 2021-04-05 ENCOUNTER — APPOINTMENT (OUTPATIENT)
Dept: NEUROSURGERY | Facility: CLINIC | Age: 51
End: 2021-04-05
Payer: COMMERCIAL

## 2021-04-05 DIAGNOSIS — G89.29 OTHER CHRONIC PAIN: ICD-10-CM

## 2021-04-05 DIAGNOSIS — M53.3 SACROCOCCYGEAL DISORDERS, NOT ELSEWHERE CLASSIFIED: ICD-10-CM

## 2021-04-05 DIAGNOSIS — M25.552 PAIN IN LEFT HIP: ICD-10-CM

## 2021-04-05 DIAGNOSIS — G35 MULTIPLE SCLEROSIS: ICD-10-CM

## 2021-04-05 PROCEDURE — 99072 ADDL SUPL MATRL&STAF TM PHE: CPT

## 2021-04-05 PROCEDURE — 99213 OFFICE O/P EST LOW 20 MIN: CPT
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
## 2021-05-19 ENCOUNTER — APPOINTMENT (OUTPATIENT)
Dept: NEUROSURGERY | Facility: CLINIC | Age: 51
End: 2021-05-19
Payer: COMMERCIAL

## 2021-05-19 DIAGNOSIS — M47.816 SPONDYLOSIS W/OUT MYELOPATHY OR RADICULOPATHY, LUMBAR REGION: ICD-10-CM

## 2021-05-19 PROCEDURE — 99441: CPT

## 2021-06-02 PROBLEM — M47.816 DEGENERATIVE JOINT DISEASE (DJD) OF LUMBAR SPINE: Status: ACTIVE | Noted: 2020-01-22

## 2021-06-02 NOTE — ASSESSMENT
[FreeTextEntry1] : WE will cont with conservative treatment currently as her back pain is improved. She will cont to f/u with pain management as needed. I will see her for reprogramming her scs as needed.

## 2021-06-02 NOTE — HISTORY OF PRESENT ILLNESS
[Home] : at home, [unfilled] , at the time of the visit. [Medical Office: ([facility])___] : at the medical office located in  [Verbal consent obtained from patient] : the patient, [unfilled] [FreeTextEntry1] : I am following up with Ms. Chung to review her MRI. Overall her back pain is improving. MRI of the lumbar spine was significant for Multi-level degenerative changes, stable L4-S1 fusion, disc herniation at L2-3 causing mild compression of thecal sac.

## 2021-08-19 ENCOUNTER — NON-APPOINTMENT (OUTPATIENT)
Age: 51
End: 2021-08-19

## 2021-08-19 ENCOUNTER — APPOINTMENT (OUTPATIENT)
Dept: UROLOGY | Facility: CLINIC | Age: 51
End: 2021-08-19
Payer: COMMERCIAL

## 2021-08-19 DIAGNOSIS — R33.8 OTHER RETENTION OF URINE: ICD-10-CM

## 2021-08-19 PROCEDURE — 99203 OFFICE O/P NEW LOW 30 MIN: CPT

## 2021-08-19 NOTE — PHYSICAL EXAM
[General Appearance - Well Developed] : well developed [General Appearance - Well Nourished] : well nourished [Normal Appearance] : normal appearance [Well Groomed] : well groomed [General Appearance - In No Acute Distress] : no acute distress [Edema] : no peripheral edema [Respiration, Rhythm And Depth] : normal respiratory rhythm and effort [Exaggerated Use Of Accessory Muscles For Inspiration] : no accessory muscle use [Abdomen Tenderness] : non-tender [Abdomen Soft] : soft [Costovertebral Angle Tenderness] : no ~M costovertebral angle tenderness [Normal Station and Gait] : the gait and station were normal for the patient's age [] : no rash [No Focal Deficits] : no focal deficits [Oriented To Time, Place, And Person] : oriented to person, place, and time [Affect] : the affect was normal [Not Anxious] : not anxious [Mood] : the mood was normal [No Palpable Adenopathy] : no palpable adenopathy [FreeTextEntry1] : 14 [place] wang catheter to leg bag with clear urine- removed without difficulty

## 2021-08-19 NOTE — END OF VISIT
[FreeTextEntry3] : I was immediately available ,  case discussed , and plan outlined with PA/ RN\par

## 2021-08-19 NOTE — ASSESSMENT
[FreeTextEntry1] : 1.  post -op retention - lumbar spine surgery (unclear of type)\par \par Plan:\par increase liquid intake, if no u/o in 6-8 hrs pt to call office or go the ER\par rto 2-3 weeks

## 2021-09-09 ENCOUNTER — APPOINTMENT (OUTPATIENT)
Dept: UROLOGY | Facility: CLINIC | Age: 51
End: 2021-09-09

## 2021-11-02 ENCOUNTER — APPOINTMENT (OUTPATIENT)
Dept: UROLOGY | Facility: CLINIC | Age: 51
End: 2021-11-02

## 2021-12-23 NOTE — ED ADULT NURSE NOTE - NSFALLRSKASSESSDT_ED_ALL_ED
"Group Therapy Documentation    PATIENT'S NAME: Zackery Zamudio  MRN:   [id number]  :   2004  ACCT. NUMBER: [id number]  DATE OF SERVICE: 21  START TIME: 11:00 AM  END TIME: 12:00 PM  FACILITATOR(S): Leann Ortiz; Kate White LADC; Ashley Paulino  TOPIC: BEH Group Therapy  Number of patients attending the group:  10  Group Length:  1 Hours    Dimensions addressed 3 and 4    Summary of Group / Topics Discussed:    Mindfulness:  How and What skills:Participate    The group focused on the skill \"Participate.\" This skill challenges the tendency to avoid or sit out, instead fully putting oneself into an activity and noticing its hard to focus on anything but the present if fully engaged. Used the game Reverse Ynvisible in teams to practice fully participation and team work.       Group Attendance:  Attended group session    Patient's response to the group topic/interactions:  cooperative with task    Patient appeared to be Actively participating.       Client specific details:  Client fully participated and became his team's leader. Client seemed to enjoy the activity and endorsed being competitive.         " 02-Aug-2018 19:27

## 2023-04-19 ENCOUNTER — EMERGENCY (EMERGENCY)
Facility: HOSPITAL | Age: 53
LOS: 0 days | Discharge: LEFT BEFORE DISCHARGE | End: 2023-04-19
Attending: STUDENT IN AN ORGANIZED HEALTH CARE EDUCATION/TRAINING PROGRAM
Payer: COMMERCIAL

## 2023-04-19 VITALS
SYSTOLIC BLOOD PRESSURE: 188 MMHG | RESPIRATION RATE: 20 BRPM | OXYGEN SATURATION: 97 % | HEART RATE: 61 BPM | DIASTOLIC BLOOD PRESSURE: 94 MMHG

## 2023-04-19 VITALS
WEIGHT: 184.97 LBS | TEMPERATURE: 98 F | HEIGHT: 65 IN | OXYGEN SATURATION: 99 % | HEART RATE: 67 BPM | SYSTOLIC BLOOD PRESSURE: 179 MMHG | DIASTOLIC BLOOD PRESSURE: 87 MMHG | RESPIRATION RATE: 20 BRPM

## 2023-04-19 DIAGNOSIS — I10 ESSENTIAL (PRIMARY) HYPERTENSION: ICD-10-CM

## 2023-04-19 DIAGNOSIS — Z90.89 ACQUIRED ABSENCE OF OTHER ORGANS: ICD-10-CM
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
## 2023-04-19 DIAGNOSIS — Z98.890 OTHER SPECIFIED POSTPROCEDURAL STATES: ICD-10-CM

## 2023-04-19 DIAGNOSIS — L05.91 PILONIDAL CYST WITHOUT ABSCESS: Chronic | ICD-10-CM

## 2023-04-19 DIAGNOSIS — Z98.891 HISTORY OF UTERINE SCAR FROM PREVIOUS SURGERY: Chronic | ICD-10-CM

## 2023-04-19 DIAGNOSIS — Z88.2 ALLERGY STATUS TO SULFONAMIDES: ICD-10-CM

## 2023-04-19 DIAGNOSIS — Z87.59 PERSONAL HISTORY OF OTHER COMPLICATIONS OF PREGNANCY, CHILDBIRTH AND THE PUERPERIUM: ICD-10-CM

## 2023-04-19 DIAGNOSIS — T40.491A POISONING BY OTHER SYNTHETIC NARCOTICS, ACCIDENTAL (UNINTENTIONAL), INITIAL ENCOUNTER: ICD-10-CM

## 2023-04-19 DIAGNOSIS — Z98.890 OTHER SPECIFIED POSTPROCEDURAL STATES: Chronic | ICD-10-CM

## 2023-04-19 DIAGNOSIS — Z88.5 ALLERGY STATUS TO NARCOTIC AGENT: ICD-10-CM

## 2023-04-19 DIAGNOSIS — G35 MULTIPLE SCLEROSIS: ICD-10-CM

## 2023-04-19 DIAGNOSIS — Z87.2 PERSONAL HISTORY OF DISEASES OF THE SKIN AND SUBCUTANEOUS TISSUE: ICD-10-CM

## 2023-04-19 DIAGNOSIS — R06.02 SHORTNESS OF BREATH: ICD-10-CM
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
## 2023-04-19 DIAGNOSIS — Z90.89 ACQUIRED ABSENCE OF OTHER ORGANS: Chronic | ICD-10-CM

## 2023-04-19 DIAGNOSIS — Z53.29 PROCEDURE AND TREATMENT NOT CARRIED OUT BECAUSE OF PATIENT'S DECISION FOR OTHER REASONS: ICD-10-CM

## 2023-04-19 DIAGNOSIS — Z98.1 ARTHRODESIS STATUS: Chronic | ICD-10-CM

## 2023-04-19 DIAGNOSIS — Z98.1 ARTHRODESIS STATUS: ICD-10-CM

## 2023-04-19 DIAGNOSIS — R07.89 OTHER CHEST PAIN: ICD-10-CM

## 2023-04-19 DIAGNOSIS — Z88.8 ALLERGY STATUS TO OTHER DRUGS, MEDICAMENTS AND BIOLOGICAL SUBSTANCES: ICD-10-CM

## 2023-04-19 LAB
ALBUMIN SERPL ELPH-MCNC: 4.2 G/DL — SIGNIFICANT CHANGE UP (ref 3.5–5.2)
ALP SERPL-CCNC: 77 U/L — SIGNIFICANT CHANGE UP (ref 30–115)
ALT FLD-CCNC: 15 U/L — SIGNIFICANT CHANGE UP (ref 0–41)
ANION GAP SERPL CALC-SCNC: 10 MMOL/L — SIGNIFICANT CHANGE UP (ref 7–14)
AST SERPL-CCNC: 20 U/L — SIGNIFICANT CHANGE UP (ref 0–41)
BASOPHILS # BLD AUTO: 0.06 K/UL — SIGNIFICANT CHANGE UP (ref 0–0.2)
BASOPHILS NFR BLD AUTO: 0.7 % — SIGNIFICANT CHANGE UP (ref 0–1)
BILIRUB SERPL-MCNC: 0.7 MG/DL — SIGNIFICANT CHANGE UP (ref 0.2–1.2)
BUN SERPL-MCNC: 8 MG/DL — LOW (ref 10–20)
CALCIUM SERPL-MCNC: 9.8 MG/DL — SIGNIFICANT CHANGE UP (ref 8.4–10.5)
CHLORIDE SERPL-SCNC: 99 MMOL/L — SIGNIFICANT CHANGE UP (ref 98–110)
CO2 SERPL-SCNC: 27 MMOL/L — SIGNIFICANT CHANGE UP (ref 17–32)
CREAT SERPL-MCNC: 0.6 MG/DL — LOW (ref 0.7–1.5)
EGFR: 108 ML/MIN/1.73M2 — SIGNIFICANT CHANGE UP
EOSINOPHIL # BLD AUTO: 0.06 K/UL — SIGNIFICANT CHANGE UP (ref 0–0.7)
EOSINOPHIL NFR BLD AUTO: 0.7 % — SIGNIFICANT CHANGE UP (ref 0–8)
GLUCOSE SERPL-MCNC: 92 MG/DL — SIGNIFICANT CHANGE UP (ref 70–99)
HCT VFR BLD CALC: 45 % — SIGNIFICANT CHANGE UP (ref 37–47)
HGB BLD-MCNC: 15.2 G/DL — SIGNIFICANT CHANGE UP (ref 12–16)
IMM GRANULOCYTES NFR BLD AUTO: 0.2 % — SIGNIFICANT CHANGE UP (ref 0.1–0.3)
LYMPHOCYTES # BLD AUTO: 1.88 K/UL — SIGNIFICANT CHANGE UP (ref 1.2–3.4)
LYMPHOCYTES # BLD AUTO: 23.4 % — SIGNIFICANT CHANGE UP (ref 20.5–51.1)
MCHC RBC-ENTMCNC: 30.2 PG — SIGNIFICANT CHANGE UP (ref 27–31)
MCHC RBC-ENTMCNC: 33.8 G/DL — SIGNIFICANT CHANGE UP (ref 32–37)
MCV RBC AUTO: 89.3 FL — SIGNIFICANT CHANGE UP (ref 81–99)
MONOCYTES # BLD AUTO: 0.81 K/UL — HIGH (ref 0.1–0.6)
MONOCYTES NFR BLD AUTO: 10.1 % — HIGH (ref 1.7–9.3)
NEUTROPHILS # BLD AUTO: 5.22 K/UL — SIGNIFICANT CHANGE UP (ref 1.4–6.5)
NEUTROPHILS NFR BLD AUTO: 64.9 % — SIGNIFICANT CHANGE UP (ref 42.2–75.2)
NRBC # BLD: 0 /100 WBCS — SIGNIFICANT CHANGE UP (ref 0–0)
PLATELET # BLD AUTO: 315 K/UL — SIGNIFICANT CHANGE UP (ref 130–400)
PMV BLD: 10.6 FL — HIGH (ref 7.4–10.4)
POTASSIUM SERPL-MCNC: 4.2 MMOL/L — SIGNIFICANT CHANGE UP (ref 3.5–5)
POTASSIUM SERPL-SCNC: 4.2 MMOL/L — SIGNIFICANT CHANGE UP (ref 3.5–5)
PROT SERPL-MCNC: 6.6 G/DL — SIGNIFICANT CHANGE UP (ref 6–8)
RBC # BLD: 5.04 M/UL — SIGNIFICANT CHANGE UP (ref 4.2–5.4)
RBC # FLD: 13.9 % — SIGNIFICANT CHANGE UP (ref 11.5–14.5)
SODIUM SERPL-SCNC: 136 MMOL/L — SIGNIFICANT CHANGE UP (ref 135–146)
TROPONIN T SERPL-MCNC: <0.01 NG/ML — SIGNIFICANT CHANGE UP
WBC # BLD: 8.05 K/UL — SIGNIFICANT CHANGE UP (ref 4.8–10.8)
WBC # FLD AUTO: 8.05 K/UL — SIGNIFICANT CHANGE UP (ref 4.8–10.8)

## 2023-04-19 PROCEDURE — 93005 ELECTROCARDIOGRAM TRACING: CPT

## 2023-04-19 PROCEDURE — 93010 ELECTROCARDIOGRAM REPORT: CPT

## 2023-04-19 PROCEDURE — 99285 EMERGENCY DEPT VISIT HI MDM: CPT | Mod: 25

## 2023-04-19 PROCEDURE — 71045 X-RAY EXAM CHEST 1 VIEW: CPT

## 2023-04-19 PROCEDURE — 80053 COMPREHEN METABOLIC PANEL: CPT

## 2023-04-19 PROCEDURE — 36415 COLL VENOUS BLD VENIPUNCTURE: CPT

## 2023-04-19 PROCEDURE — 84484 ASSAY OF TROPONIN QUANT: CPT

## 2023-04-19 PROCEDURE — 85025 COMPLETE CBC W/AUTO DIFF WBC: CPT

## 2023-04-19 PROCEDURE — 99285 EMERGENCY DEPT VISIT HI MDM: CPT

## 2023-04-19 PROCEDURE — 71045 X-RAY EXAM CHEST 1 VIEW: CPT | Mod: 26

## 2023-04-19 NOTE — ED PROVIDER NOTE - NSFOLLOWUPINSTRUCTIONS_ED_ALL_ED_FT
Chest Pain    Chest pain can be caused by many different conditions which may or may not be dangerous. Causes include heartburn, lung infections, heart attack, blood clot in lungs, skin infections, strain or damage to muscle, cartilage, or bones, etc. In addition to a history and physical examination, an electrocardiogram (ECG) or other lab tests may have been performed to determine the cause of your chest pain. Follow up with your primary care provider or with a cardiologist as instructed.     SEEK IMMEDIATE MEDICAL CARE IF YOU HAVE ANY OF THE FOLLOWING SYMPTOMS: worsening chest pain, coughing up blood, unexplained back/neck/jaw pain, severe abdominal pain, dizziness or lightheadedness, fainting, shortness of breath, sweaty or clammy skin, vomiting, or racing heart beat. These symptoms may represent a serious problem that is an emergency. Do not wait to see if the symptoms will go away. Get medical help right away. Call 911 and do not drive yourself to the hospital.    Adult Overdose    WHAT YOU NEED TO KNOW:    An overdose occurs when you take more medicine than is safe to take. An overdose may be mild, or it may be a life-threatening emergency. You may feel drowsy, dizzy, or nauseated, depending on what medicine you took. No specific harm was found to your body as a result of your overdose. Your symptoms have decreased over the last 6 to 12 hours.    DISCHARGE INSTRUCTIONS:    Call 911 if you or someone close to you has any of the following symptoms:     Your face is very pale and clammy to the touch.       Your body is limp or you are unable to speak.      You cannot be awakened.       Your breathing is slower or faster than usual.       Your heart is beating slower than usual.      You feel confused or more tired than usual, or you are sweating more than normal.      Your speech is slurred.       Your fingernails or lips are blue or purple.    Return to the emergency department if:     You have severe nausea and vomiting.      You cannot have a bowel movement or urinate.      Your skin and the whites of your eyes turn yellow.    Contact your healthcare provider if:     You think your medicine is not working.      You have nausea, vomiting, diarrhea, or abdominal cramps.      You have questions or concerns about your medicine.    Take your medicine as directed: Contact your healthcare provider if you think your medicine is not helping or if you have side effects. Do not take more medicine that is prescribed. Keep your medicines in the original containers. Keep a list of the medicines, vitamins, and herbs you take. Include the amounts, and when and why you take them. Do not share your medicine with others.    Prevent another overdose:     Read labels carefully. Read the labels of all the medicines that you take. Never take more than the label says to take. If you have questions, ask your pharmacist or healthcare provider.      Do not drink alcohol. Alcohol increases your risk for another overdose. Alcohol can also hide important symptoms that you need to call your healthcare provider for.      Do not drive or operate machinery until your healthcare provider says it is okay. These activities may be dangerous after an overdose.      Use caution if you take more than one medicine at a time. Mixing medicines or taking more than one medicine at a time can be dangerous.      Tell your family or friends what medicines you are taking. Talk with them about what to do if you have an overdose.     Follow up with your healthcare provider as directed: You may need to see a counselor or psychiatrist. Write down your questions so you remember to ask them during your visits.        © Copyright [employer] 2019 All illustrations and images included in CareNotes are the copyrighted property of [employer] or [employer]. YOU ARE LEAVING AGAINST MEDICAL ADVICE. YOU ARE RISKING SEVERE ILLNESS INCLUDING DEATH. YOU MUST FOLLOW UP WITH YOUR DOCTOR WITHIN 24 HOURS. RETURN FOR WORSENING OF SYMPTOMS OR ANY OTHER CONCERNS.     Chest Pain    Chest pain can be caused by many different conditions which may or may not be dangerous. Causes include heartburn, lung infections, heart attack, blood clot in lungs, skin infections, strain or damage to muscle, cartilage, or bones, etc. In addition to a history and physical examination, an electrocardiogram (ECG) or other lab tests may have been performed to determine the cause of your chest pain. Follow up with your primary care provider or with a cardiologist as instructed.     SEEK IMMEDIATE MEDICAL CARE IF YOU HAVE ANY OF THE FOLLOWING SYMPTOMS: worsening chest pain, coughing up blood, unexplained back/neck/jaw pain, severe abdominal pain, dizziness or lightheadedness, fainting, shortness of breath, sweaty or clammy skin, vomiting, or racing heart beat. These symptoms may represent a serious problem that is an emergency. Do not wait to see if the symptoms will go away. Get medical help right away. Call 911 and do not drive yourself to the hospital.    Adult Overdose    WHAT YOU NEED TO KNOW:    An overdose occurs when you take more medicine than is safe to take. An overdose may be mild, or it may be a life-threatening emergency. You may feel drowsy, dizzy, or nauseated, depending on what medicine you took. No specific harm was found to your body as a result of your overdose. Your symptoms have decreased over the last 6 to 12 hours.    DISCHARGE INSTRUCTIONS:    Call 911 if you or someone close to you has any of the following symptoms:     Your face is very pale and clammy to the touch.       Your body is limp or you are unable to speak.      You cannot be awakened.       Your breathing is slower or faster than usual.       Your heart is beating slower than usual.      You feel confused or more tired than usual, or you are sweating more than normal.      Your speech is slurred.       Your fingernails or lips are blue or purple.    Return to the emergency department if:     You have severe nausea and vomiting.      You cannot have a bowel movement or urinate.      Your skin and the whites of your eyes turn yellow.    Contact your healthcare provider if:     You think your medicine is not working.      You have nausea, vomiting, diarrhea, or abdominal cramps.      You have questions or concerns about your medicine.    Take your medicine as directed: Contact your healthcare provider if you think your medicine is not helping or if you have side effects. Do not take more medicine that is prescribed. Keep your medicines in the original containers. Keep a list of the medicines, vitamins, and herbs you take. Include the amounts, and when and why you take them. Do not share your medicine with others.    Prevent another overdose:     Read labels carefully. Read the labels of all the medicines that you take. Never take more than the label says to take. If you have questions, ask your pharmacist or healthcare provider.      Do not drink alcohol. Alcohol increases your risk for another overdose. Alcohol can also hide important symptoms that you need to call your healthcare provider for.      Do not drive or operate machinery until your healthcare provider says it is okay. These activities may be dangerous after an overdose.      Use caution if you take more than one medicine at a time. Mixing medicines or taking more than one medicine at a time can be dangerous.      Tell your family or friends what medicines you are taking. Talk with them about what to do if you have an overdose.     Follow up with your healthcare provider as directed: You may need to see a counselor or psychiatrist. Write down your questions so you remember to ask them during your visits.        © Copyright [employer] 2019 All illustrations and images included in CareNotes are the copyrighted property of [employer] or [employer].

## 2023-04-19 NOTE — ED PROVIDER NOTE - PROVIDER TOKENS
FREE:[LAST:[your PMD/cardiologist],PHONE:[(   )    -],FAX:[(   )    -],FOLLOWUP:[Urgent]] FREE:[LAST:[your PMD/cardiologist],PHONE:[(   )    -],FAX:[(   )    -],FOLLOWUP:[Urgent]],PROVIDER:[TOKEN:[[id number]],ESTABLISHEDPATIENT:[T]]

## 2023-04-19 NOTE — ED PROVIDER NOTE - ATTENDING APP SHARED VISIT CONTRIBUTION OF CARE
52-year-old female with history of hypertension presents to the ED for chest discomfort that developed at rest, after taking extra dose of tapentadol.  Symptoms nonpleuritic, nonradiating and completely resolved to my evaluation.  She has never seen a cardiologist. Reports remote h/o tobacco use.     vs noted, triage note reviewed    Gen - NAD, Head - NCAT, Pharynx - clear, MMM, Heart - RRR, no m/g/r, Lungs - CTAB, no w/c/r, Abdomen - soft, NT, ND, Skin - No rash, Extremities - FROM, no edema, erythema, ecchymosis, brisk cap refill, Neuro - A&O x3, equal strength and sensation, non-focal exam    a/p:  atypical chest pain today  ekg, cxr, labs, recommend trop x2  reassess

## 2023-04-19 NOTE — ED PROVIDER NOTE - CARE PROVIDER_API CALL
your PMD/cardiologist,   Phone: (   )    -  Fax: (   )    -  Follow Up Time: Urgent   your PMD/cardiologist,   Phone: (   )    -  Fax: (   )    -  Follow Up Time: Urgent    Jordan Pineda (MD)  Cardiovascular Disease; Interventional Cardiology  [address] [place] [address]  Phone: [phone]  Fax: [phone]  Established Patient  Follow Up Time:

## 2023-04-19 NOTE — ED PROVIDER NOTE - PROGRESS NOTE DETAILS
Patient does not want to stay for further evaluation and wishes to leave AMA.  Aware of risk of severe illness including death.  Patient states her brother is a cardiologist and she will follow-up tomorrow.

## 2023-04-19 NOTE — ED PROVIDER NOTE - PATIENT PORTAL LINK FT
You can access the FollowMyHealth Patient Portal offered by [facility] [place] by registering at the following website: [email or web address]. By joining [employer]’s FollowMyHealth portal, you will also be able to view your health information using other applications (apps) compatible with our system.

## 2023-04-19 NOTE — ED PROVIDER NOTE - CLINICAL SUMMARY MEDICAL DECISION MAKING FREE TEXT BOX
52-year-old female with history of hypertension presents to the ED for chest discomfort that developed at rest, after taking extra dose of tapentadol.  Symptoms nonpleuritic, nonradiating and completely resolved to my evaluation.  She has never seen a cardiologist. Reports remote h/o tobacco use. EKG nonischemic, labs and imaging personally reviewed.  Troponin negative.  Recommended 2 sets of troponins, However patient does not want to stay and is leaving AGAINST MEDICAL ADVICE. Her brother is Dr. Potts and can f/u op.  I have discussed the risks, benefits and alternatives (including the possibility of worsening of disease, pain, permanent disability, and/or death) with the patient and his/her family (if available).  The patient voices understanding of these risks, benefits, and alternatives and still wishes to sign out against medical advice.  The patient is awake, alert, oriented x 3 and has demonstrated capacity to refuse/direct care.  I have advised the patient that they can and should return immediately should they develop any worse/different/additional symptoms, or if they change their mind and want to continue their care. Patient has full capacity and has verbalized understanding.  Given detailed returned precautions.

## 2023-04-19 NOTE — ED ADULT NURSE NOTE - OBJECTIVE STATEMENT
pt stated she took an extra pain pill instead of taking her blood pressure medication by accident   "I just opened the bottle and took it without realizing"   pt stated she had chest heaviness at time, denies chest discomfort or shortness of breath at time of assessment   pt denies thoughts of suicide  denies SI/HI/AVH

## 2023-04-19 NOTE — ED PROVIDER NOTE - OBJECTIVE STATEMENT
52-year-old female history of back surgeries, MS, hypertension complaining of accidental overdose of Nucynta 100 mg today.  Patient states that she normally takes  Nucynta 3 times daily.  She took  Nucynta at 4:30 AM today, 11:30 AM today and accidentally took the third dose early at 1:30 PM today instead of her blood pressure medication.  Patient states she started feeling shortness of breath and chest tightness afterwards.

## 2023-05-25 ENCOUNTER — APPOINTMENT (OUTPATIENT)
Dept: NEUROSURGERY | Facility: CLINIC | Age: 53
End: 2023-05-25

## 2023-06-13 ENCOUNTER — APPOINTMENT (OUTPATIENT)
Dept: CARDIOLOGY | Facility: CLINIC | Age: 53
End: 2023-06-13
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
## 2023-07-07 NOTE — PHYSICAL EXAM
[UE] : Sensory: Intact in bilateral upper extremities [Normal RUE] : Right Upper Extremity: No scars, rashes, lesions, ulcers, skin intact [Normal LUE] : Left Upper Extremity: No scars, rashes, lesions, ulcers, skin intact [Normal Touch] : sensation intact for touch [Normal] : Oriented to person, place, and time, insight and judgement were intact and the affect was normal [de-identified] : \par \par

## 2023-07-10 ENCOUNTER — APPOINTMENT (OUTPATIENT)
Dept: ORTHOPEDIC SURGERY | Facility: CLINIC | Age: 53
End: 2023-07-10

## 2023-07-21 ENCOUNTER — APPOINTMENT (OUTPATIENT)
Dept: ORTHOPEDIC SURGERY | Facility: CLINIC | Age: 53
End: 2023-07-21
Payer: COMMERCIAL

## 2023-07-21 VITALS — BODY MASS INDEX: 30.82 KG/M2 | WEIGHT: 185 LBS | HEIGHT: 65 IN

## 2023-07-21 DIAGNOSIS — Z72.3 LACK OF PHYSICAL EXERCISE: ICD-10-CM

## 2023-07-21 PROCEDURE — 73070 X-RAY EXAM OF ELBOW: CPT | Mod: LT

## 2023-07-21 PROCEDURE — 73030 X-RAY EXAM OF SHOULDER: CPT | Mod: LT

## 2023-07-21 PROCEDURE — 99203 OFFICE O/P NEW LOW 30 MIN: CPT

## 2023-07-21 RX ORDER — CHLORZOXAZONE 500 MG/1
500 TABLET ORAL EVERY 8 HOURS
Qty: 21 | Refills: 0 | Status: COMPLETED | COMMUNITY
Start: 2020-02-19 | End: 2023-07-21

## 2023-07-21 RX ORDER — CLINDAMYCIN HYDROCHLORIDE 300 MG/1
300 CAPSULE ORAL EVERY 6 HOURS
Qty: 40 | Refills: 0 | Status: COMPLETED | COMMUNITY
Start: 2020-03-02 | End: 2023-07-21

## 2023-07-21 RX ORDER — GABAPENTIN 600 MG/1
600 TABLET, COATED ORAL
Refills: 0 | Status: ACTIVE | COMMUNITY
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
## 2023-07-21 NOTE — PHYSICAL EXAM
[UE] : Sensory: Intact in bilateral upper extremities [Normal RUE] : Right Upper Extremity: No scars, rashes, lesions, ulcers, skin intact [Normal LUE] : Left Upper Extremity: No scars, rashes, lesions, ulcers, skin intact [Normal Touch] : sensation intact for touch [Normal] : Oriented to person, place, and time, insight and judgement were intact and the affect was normal [Wheelchair] : uses a wheelchair [de-identified] : LEFT shoulder\par No edema, ecchymoses, erythema.\par Nontender left shoulder\par Pain with elevation of the left shoulder which she localizes more to the elbow even though the elbow is not moving and remains extended.  +painful arc upper levels of elevation.  Forward elevation of the shoulder is to about 165 to 170 degrees.\par No drop arm.\par Internal rotation is also with the pain at about T10\par External rotation with the arm at the side is to about 65 degrees.\par Motor is 4+/5 supraspinatus and 5-/5 external rotation and 5/5 internal rotation\par Pain with Neer and Sol.\par \par Left elbow\par Elbow range of motion 0 to 140 degrees.  90 degrees pronation and supination.  Some pain with range of motion anterior elbow.\par No significant tenderness medial or lateral epicondyles or over the common flexor or extensor origins.\par 5/5  strength.  Motor and sensation are intact distally\par  [de-identified] : Overweight [de-identified] : \par \par X-rays taken today of LEFT shoulder AP, Y lateral and axillary views showed inferior humeral head osteophyte consistent with osteoarthritis.  There is mild narrowing glenohumeral joint.  Mild osteoarthritis.  No elevation humeral head\par \par X-rays taken today of LEFT elbow AP and lateral views showed no definite fractures.  No osteoarthritis\par \par MRI of LEFT shoulder performed 4/28/23 showed minimal osteoarthritis moderate to large full-thickness supraspinatus tendon tear with retraction.  To me it looks like the tear may extend into the anterior infraspinatus as well.\par \par MRI of LEFT elbow performed 4/28/23 showed common flexor origin tendinosis with partial-thickness deep fiber tear. No collateral ligament tear or sprain. No contusion or fracture. \par \par
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
## 2023-09-15 ENCOUNTER — APPOINTMENT (OUTPATIENT)
Dept: ORTHOPEDIC SURGERY | Facility: CLINIC | Age: 53
End: 2023-09-15
Payer: COMMERCIAL

## 2023-09-15 DIAGNOSIS — M77.12 LATERAL EPICONDYLITIS, LEFT ELBOW: ICD-10-CM

## 2023-09-15 PROCEDURE — 99213 OFFICE O/P EST LOW 20 MIN: CPT
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
## 2023-12-15 ENCOUNTER — APPOINTMENT (OUTPATIENT)
Dept: ORTHOPEDIC SURGERY | Facility: CLINIC | Age: 53
End: 2023-12-15

## 2024-01-11 ENCOUNTER — NON-APPOINTMENT (OUTPATIENT)
Age: 54
End: 2024-01-11

## 2024-02-02 ENCOUNTER — APPOINTMENT (OUTPATIENT)
Dept: ORTHOPEDIC SURGERY | Facility: CLINIC | Age: 54
End: 2024-02-02
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
## 2024-03-15 NOTE — PHYSICAL EXAM
[de-identified] : LEFT shoulder No edema, ecchymoses, erythema. Nontender left shoulder Pain with elevation of the left shoulder above 90 degrees which she localizes more to the elbow even though the elbow is not moving and remains extended.  +painful arc upper levels of elevation.  Forward elevation of the shoulder actively is to about 130 degrees and 170 degrees passively. No drop arm. Internal rotation is also with the pain at about L1 External rotation with the arm at the side is to about 65 degrees. Motor is 4+/5 supraspinatus and 5-/5 external rotation and 5/5 internal rotation Pain with Neer and Sol.  Left elbow Elbow range of motion 0 to 140 degrees.  90 degrees pronation and supination.  No pain with range of motion elbow. No significant tenderness medial or lateral epicondyles or over the common flexor or extensor origins. No pain with resisted middle finger extension.  Minimal pain with biceps curl. 5/5  strength.  Motor and sensation are intact distally  [de-identified] :  X-rays taken 9/15/23 of LEFT shoulder AP, Y lateral and axillary views showed inferior humeral head osteophyte consistent with osteoarthritis.  There is mild narrowing glenohumeral joint.  Mild osteoarthritis.  No elevation humeral head  X-rays taken 9/15/23 of LEFT elbow AP and lateral views showed no definite fractures.  No osteoarthritis  MRI of LEFT shoulder performed 4/28/23 showed minimal osteoarthritis moderate to large full-thickness supraspinatus tendon tear with retraction.  To me it looks like the tear may extend into the anterior infraspinatus as well.  MRI of LEFT elbow performed 4/28/23 showed common flexor origin tendinosis with partial-thickness deep fiber tear. No collateral ligament tear or sprain. No contusion or fracture.    [de-identified] : Overweight

## 2024-05-17 ENCOUNTER — APPOINTMENT (OUTPATIENT)
Dept: ORTHOPEDIC SURGERY | Facility: CLINIC | Age: 54
End: 2024-05-17
Payer: COMMERCIAL

## 2024-05-17 DIAGNOSIS — S46.912A STRAIN OF UNSPECIFIED MUSCLE, FASCIA AND TENDON AT SHOULDER AND UPPER ARM LEVEL, LEFT ARM, INITIAL ENCOUNTER: ICD-10-CM

## 2024-05-17 DIAGNOSIS — S46.012A STRAIN OF MUSCLE(S) AND TENDON(S) OF THE ROTATOR CUFF OF LEFT SHOULDER, INITIAL ENCOUNTER: ICD-10-CM

## 2024-05-17 DIAGNOSIS — M25.522 PAIN IN LEFT ELBOW: ICD-10-CM

## 2024-05-17 DIAGNOSIS — M75.42 IMPINGEMENT SYNDROME OF LEFT SHOULDER: ICD-10-CM

## 2024-05-17 PROCEDURE — 99213 OFFICE O/P EST LOW 20 MIN: CPT | Mod: 25

## 2024-05-17 PROCEDURE — 20610 DRAIN/INJ JOINT/BURSA W/O US: CPT | Mod: 59,LT

## 2024-05-17 NOTE — ASSESSMENT
[FreeTextEntry1] : 52 y/o female right-hand-dominant with chronic back pain and a foot drop limiting her mobility so that she uses a crutch, walker or motorized wheelchair for ambulation.   She had MRI of the left shoulder showing a tear of the supraspinatus and likely anterior infraspinatus tendons with retraction.  MRI of the elbow showed partial tear of the common flexor origin.  At this point it seems like her elbow is fine but has the ongoing shoulder pain and some weakness and stiffness.  She has not been able to do a good course of physical therapy because it is hard for her to get to a physical therapist and apparently the insurance has not paid for someone to come to her home. I gave her surgical tubing bands and exercises today.  She should try to get home therapy or physical therapy. We did a steroid injection which hopefully will give her relief.  With the lidocaine she did seem to have very good pain relief and could lift her arm to 170 degrees without any real pain. Before considering surgery would like to see if she would be able to do a good course of therapy.  We would not want to do surgery if she could not do the rehab needed afterwards to recover properly. Her other issue is that she is in the wheelchair and has the back and leg issues so she uses her arms a lot for mobility.  Therefore it would be difficult for her to be in a sling and not use her arm for 6 weeks.  If she was using the arm pushing and pulling it could undo or rule in any surgery that is done.  She would also like to avoid surgery given all of her prior surgeries. She takes chronic narcotics for her chronic pain with pain in the back.  She may benefit from taking an NSAID like ibuprofen or naproxen for regular. Follow-up in about 2 months if she is not better.

## 2024-05-17 NOTE — PHYSICAL EXAM
[Wheelchair] : uses a wheelchair [UE] : Sensory: Intact in bilateral upper extremities [Normal RUE] : Right Upper Extremity: No scars, rashes, lesions, ulcers, skin intact [Normal LUE] : Left Upper Extremity: No scars, rashes, lesions, ulcers, skin intact [Normal Touch] : sensation intact for touch [Normal] : Oriented to person, place, and time, insight and judgement were intact and the affect was normal [de-identified] : Overweight [de-identified] : LEFT shoulder No edema, ecchymoses, erythema. Nontender left shoulder   +painful arc upper levels of elevation.  Forward elevation of the shoulder actively is to about 160 degrees with mild shoulder shrug and 170 degrees passively.  About 100 degrees abduction. No drop arm. Internal rotation is also with the pain at about L1 External rotation with the arm at the side is to about 65 degrees. Motor is 4+/5 supraspinatus and 5-/5 external rotation and 5/5 internal rotation and biceps Pain with Neer and Sol.  Elbow range of motion 0 to 140 degrees.  90 degrees pronation and supination.  No pain with range of motion elbow. No significant tenderness medial or lateral epicondyles or over the common flexor or extensor origins. No pain with resisted middle finger extension.  Minimal pain with biceps curl. 5/5  strength.  Motor and sensation are intact distally  [de-identified] :   X-rays taken 9/15/23 of LEFT shoulder AP, Y lateral and axillary views showed inferior humeral head osteophyte consistent with osteoarthritis.  There is mild narrowing glenohumeral joint.  Mild osteoarthritis.  No elevation humeral head  X-rays taken 9/15/23 of LEFT elbow AP and lateral views showed no definite fractures.  No osteoarthritis  MRI of LEFT shoulder performed 4/28/23 showed minimal osteoarthritis moderate to large full-thickness supraspinatus tendon tear with retraction.  To me it looks like the tear may extend into the anterior infraspinatus as well.  MRI of LEFT elbow performed 4/28/23 showed common flexor origin tendinosis with partial-thickness deep fiber tear. No collateral ligament tear or sprain. No contusion or fracture.

## 2024-05-17 NOTE — PROCEDURE
[Injection] : Injection [Left] : of the left [Inflammation] : inflammation [Joint Pain] : joint pain [Bleeding] : bleeding [Infection] : infection [Allergic Reaction] : allergic reaction [Patient] : patient [Risk] : risk [Benefits] : benefits [Alternatives] : alternatives [Verbal Consent Obtained] : verbal consent was obtained prior to the procedure [Ethyl Chloride Spray] : ethyl chloride spray was used as a topical anesthetic [Posterior] : posterior [1% Lidocaine___(mL)] : [unfilled] mL of 1% Lidocaine [Triamcinolone 40mg/mL___(mL)] : [unfilled] ~UmL of 40mg/mL triamcinolone [Bandage Applied] : a bandage [Tolerated Well] : The patient tolerated the procedure well [None] : none [No Strenuous Activity___day(s)] : avoid strenuous activity for [unfilled] day(s) [PRN] : PRN [Subacromial Bursa] : subacromial bursa [22] : a 22-gauge
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
## 2024-09-03 NOTE — ASU PATIENT PROFILE, ADULT - AS SC BRADEN MOISTURE
----- Message from Wendie Smith PA-C sent at 9/3/2024  4:43 PM CDT -----  Regarding: RE: SOB  Thanks for the update. He can go ahead and resume torsemide 20 mg every other day at this time, and move his lab appointment up to 9/10 or 9/11 please. Please continue to check daily weights. Low threshold to go to the ED if shortness of breath worsens.     Thanks,   Wendie  ----- Message -----  From: Jennifer Guevara, RN  Sent: 9/3/2024   4:11 PM CDT  To: Jennifer Guevara RN; #  Subject: SOB                                              5 days ago pt stopped his torsemide as instructed. Today he called c/o SOB and weak legs with any activity including walking around the house. I believe some mild swelling also.   As in previous conversations he is still very worried about being off the torsemide and I talked to him again about the concern about his kidneys. I also reminded him that the lab visit was on 9/12 as he thought it was as per the original date.  I am still waiting on his daughter to call me back concerning transportation to these appts. He told me that she is very worried about his health and I reminded him that she needs to call me.    Please advise,   (4) rarely moist

## 2024-10-17 ENCOUNTER — APPOINTMENT (OUTPATIENT)
Dept: ORTHOPEDIC SURGERY | Facility: CLINIC | Age: 54
End: 2024-10-17

## 2024-11-05 ENCOUNTER — APPOINTMENT (OUTPATIENT)
Dept: ORTHOPEDIC SURGERY | Facility: CLINIC | Age: 54
End: 2024-11-05

## 2024-11-15 ENCOUNTER — APPOINTMENT (OUTPATIENT)
Dept: ORTHOPEDIC SURGERY | Facility: CLINIC | Age: 54
End: 2024-11-15
Payer: COMMERCIAL

## 2024-11-15 DIAGNOSIS — M75.42 IMPINGEMENT SYNDROME OF LEFT SHOULDER: ICD-10-CM

## 2024-11-15 DIAGNOSIS — S46.012A STRAIN OF MUSCLE(S) AND TENDON(S) OF THE ROTATOR CUFF OF LEFT SHOULDER, INITIAL ENCOUNTER: ICD-10-CM

## 2024-11-15 DIAGNOSIS — Z72.3 LACK OF PHYSICAL EXERCISE: ICD-10-CM

## 2024-11-15 DIAGNOSIS — M19.012 PRIMARY OSTEOARTHRITIS, LEFT SHOULDER: ICD-10-CM

## 2024-11-15 DIAGNOSIS — M47.812 SPONDYLOSIS W/OUT MYELOPATHY OR RADICULOPATHY, CERVICAL REGION: ICD-10-CM

## 2024-11-15 PROCEDURE — 73030 X-RAY EXAM OF SHOULDER: CPT | Mod: LT

## 2024-11-15 PROCEDURE — 72040 X-RAY EXAM NECK SPINE 2-3 VW: CPT

## 2024-11-15 PROCEDURE — 99214 OFFICE O/P EST MOD 30 MIN: CPT

## 2024-12-10 ENCOUNTER — APPOINTMENT (OUTPATIENT)
Dept: ORTHOPEDIC SURGERY | Facility: CLINIC | Age: 54
End: 2024-12-10
Payer: COMMERCIAL

## 2024-12-10 PROCEDURE — G2012 BRIEF CHECK IN BY MD/QHP: CPT

## 2024-12-17 ENCOUNTER — APPOINTMENT (OUTPATIENT)
Dept: ORTHOPEDIC SURGERY | Facility: CLINIC | Age: 54
End: 2024-12-17
Payer: COMMERCIAL

## 2024-12-17 DIAGNOSIS — M47.812 SPONDYLOSIS W/OUT MYELOPATHY OR RADICULOPATHY, CERVICAL REGION: ICD-10-CM

## 2024-12-17 DIAGNOSIS — M48.02 SPINAL STENOSIS, CERVICAL REGION: ICD-10-CM

## 2024-12-17 DIAGNOSIS — M67.922 UNSPECIFIED DISORDER OF SYNOVIUM AND TENDON, LEFT UPPER ARM: ICD-10-CM

## 2024-12-17 DIAGNOSIS — S46.012A STRAIN OF MUSCLE(S) AND TENDON(S) OF THE ROTATOR CUFF OF LEFT SHOULDER, INITIAL ENCOUNTER: ICD-10-CM

## 2024-12-17 DIAGNOSIS — M67.814 OTHER SPECIFIED DISORDERS OF TENDON, LEFT SHOULDER: ICD-10-CM

## 2024-12-17 DIAGNOSIS — M19.012 PRIMARY OSTEOARTHRITIS, LEFT SHOULDER: ICD-10-CM

## 2024-12-17 PROCEDURE — 99214 OFFICE O/P EST MOD 30 MIN: CPT

## 2024-12-20 ENCOUNTER — APPOINTMENT (OUTPATIENT)
Dept: ORTHOPEDIC SURGERY | Facility: CLINIC | Age: 54
End: 2024-12-20

## 2025-01-21 ENCOUNTER — APPOINTMENT (OUTPATIENT)
Dept: ORTHOPEDIC SURGERY | Facility: CLINIC | Age: 55
End: 2025-01-21
Payer: COMMERCIAL

## 2025-01-21 VITALS — BODY MASS INDEX: 28.66 KG/M2 | WEIGHT: 172 LBS | HEIGHT: 65 IN

## 2025-01-21 DIAGNOSIS — M75.102 UNSPECIFIED ROTATOR CUFF TEAR OR RUPTURE OF LEFT SHOULDER, NOT SPECIFIED AS TRAUMATIC: ICD-10-CM

## 2025-01-21 PROCEDURE — 99214 OFFICE O/P EST MOD 30 MIN: CPT

## 2025-01-29 ENCOUNTER — APPOINTMENT (OUTPATIENT)
Dept: ORTHOPEDIC SURGERY | Facility: AMBULATORY SURGERY CENTER | Age: 55
End: 2025-01-29

## 2025-02-11 ENCOUNTER — APPOINTMENT (OUTPATIENT)
Dept: ORTHOPEDIC SURGERY | Facility: CLINIC | Age: 55
End: 2025-02-11
Payer: COMMERCIAL

## 2025-02-11 DIAGNOSIS — M75.102 UNSPECIFIED ROTATOR CUFF TEAR OR RUPTURE OF LEFT SHOULDER, NOT SPECIFIED AS TRAUMATIC: ICD-10-CM

## 2025-02-11 PROCEDURE — 99204 OFFICE O/P NEW MOD 45 MIN: CPT

## 2025-03-04 NOTE — ASU PATIENT PROFILE, ADULT - PAIN, CHRONIC: FACTORS THAT AGGRAVATE, PROFILE
Detail Level: Detailed
Quality 110: Preventive Care And Screening: Influenza Immunization: Influenza Immunization Ordered or Recommended, but not Administered due to system reason
Quality 130: Documentation Of Current Medications In The Medical Record: Current Medications Documented
activity

## 2025-03-04 NOTE — ASU PATIENT PROFILE, ADULT - NSICDXPASTSURGICALHX_GEN_ALL_CORE_FT
PAST SURGICAL HISTORY:  H/O  section     H/O gastric sleeve     History of lumbar fusion 2017, 2016 and ,     Pilonidal cyst     S/P endometrial ablation TWICE, 10 YRS AGO    S/P tonsillectomy

## 2025-03-05 ENCOUNTER — OUTPATIENT (OUTPATIENT)
Dept: OUTPATIENT SERVICES | Facility: HOSPITAL | Age: 55
LOS: 1 days | Discharge: HOME | End: 2025-03-05

## 2025-03-05 ENCOUNTER — APPOINTMENT (OUTPATIENT)
Dept: ORTHOPEDIC SURGERY | Facility: AMBULATORY SURGERY CENTER | Age: 55
End: 2025-03-05

## 2025-03-05 VITALS
SYSTOLIC BLOOD PRESSURE: 217 MMHG | RESPIRATION RATE: 16 BRPM | TEMPERATURE: 99 F | HEIGHT: 65 IN | HEART RATE: 55 BPM | OXYGEN SATURATION: 99 % | WEIGHT: 180.78 LBS | DIASTOLIC BLOOD PRESSURE: 99 MMHG

## 2025-03-05 VITALS
RESPIRATION RATE: 16 BRPM | OXYGEN SATURATION: 99 % | HEART RATE: 58 BPM | DIASTOLIC BLOOD PRESSURE: 106 MMHG | SYSTOLIC BLOOD PRESSURE: 212 MMHG

## 2025-03-05 DIAGNOSIS — Z98.891 HISTORY OF UTERINE SCAR FROM PREVIOUS SURGERY: Chronic | ICD-10-CM

## 2025-03-05 DIAGNOSIS — L05.91 PILONIDAL CYST WITHOUT ABSCESS: Chronic | ICD-10-CM

## 2025-03-05 DIAGNOSIS — Z90.3 ACQUIRED ABSENCE OF STOMACH [PART OF]: Chronic | ICD-10-CM

## 2025-03-05 DIAGNOSIS — Z90.89 ACQUIRED ABSENCE OF OTHER ORGANS: Chronic | ICD-10-CM

## 2025-03-05 DIAGNOSIS — Z98.1 ARTHRODESIS STATUS: Chronic | ICD-10-CM

## 2025-03-05 DIAGNOSIS — Z98.890 OTHER SPECIFIED POSTPROCEDURAL STATES: Chronic | ICD-10-CM

## 2025-03-05 RX ORDER — TIZANIDINE 4 MG/1
2 TABLET ORAL
Refills: 0 | DISCHARGE

## 2025-03-05 RX ORDER — APREPITANT 40 MG/1
40 CAPSULE ORAL ONCE
Refills: 0 | Status: COMPLETED | OUTPATIENT
Start: 2025-03-05 | End: 2025-03-05

## 2025-03-05 RX ADMIN — Medication 1 APPLICATION(S): at 08:09

## 2025-03-05 RX ADMIN — APREPITANT 40 MILLIGRAM(S): 40 CAPSULE ORAL at 08:34

## 2025-03-05 NOTE — ASU PREOP CHECKLIST - 3.
Patient cancelled for surgery as per Dr Frederick and Dr Soto.  09:15 Patient to home in stable condition accompanied by  on wheelchair Patient cancelled for surgery as per Dr Frederick and Dr Soto.  09:15 Patient to home in stable condition accompanied by  on wheelchair.  Declined drink or food as offered.

## 2025-03-05 NOTE — ASU PREOP CHECKLIST - 1.
Patient BP elevated 217/99, 221/99, patient has a chronic back pain and claimed she did not take her pain medicine today, pain scale of 8/10.  Dr Soto here with patient informed (08:55 ) Patient BP elevated 217/99, 221/99, assymptomatic, patient has a chronic back pain and claimed she did not take her pain medicine today, pain scale of 8/10.  Dr Soto here with patient informed (08:55 )

## 2025-04-01 NOTE — ASU PATIENT PROFILE, ADULT - FALL HARM RISK - HARM RISK INTERVENTIONS
Communicate Risk of Fall with Harm to all staff/Reinforce activity limits and safety measures with patient and family/Tailored Fall Risk Interventions/Visual Cue: Yellow wristband and red socks/Bed in lowest position, wheels locked, appropriate side rails in place/Call bell, personal items and telephone in reach/Instruct patient to call for assistance before getting out of bed or chair/Non-slip footwear when patient is out of bed/Energy to call system/Physically safe environment - no spills, clutter or unnecessary equipment/Purposeful Proactive Rounding/Room/bathroom lighting operational, light cord in reach Assistance with ambulation/Assistance OOB with selected safe patient handling equipment/Communicate Risk of Fall with Harm to all staff/Discuss with provider need for PT consult/Monitor gait and stability/Provide patient with walking aids - walker, cane, crutches/Reinforce activity limits and safety measures with patient and family/Tailored Fall Risk Interventions/Visual Cue: Yellow wristband and red socks/Bed in lowest position, wheels locked, appropriate side rails in place/Call bell, personal items and telephone in reach/Instruct patient to call for assistance before getting out of bed or chair/Non-slip footwear when patient is out of bed/[place] to call system/Physically safe environment - no spills, clutter or unnecessary equipment/Purposeful Proactive Rounding/Room/bathroom lighting operational, light cord in reach

## 2025-04-01 NOTE — ASU PATIENT PROFILE, ADULT - PAIN DESCRIPTION (FREQUENCY/QUALITY), PROFILE
Patient is here today for complete physical examination as well as re-evaluation of multiple other medical problems including depression, anxiety, resistant hypertension.  She is doing quite a bit better on the current medications.  She does complain of pain over the left great toe from an ingrown toenail.  She has had a recurrent problem with this.The remainder of review systems is essentially negative as reviewed.  She continues with chronic diarrhea for which reportedly she had colonoscopy done long time ago.  PAST MEDICAL HX:    Anxiety disorder                                              Depression                                                    Hip dysplasia                                                   Comment: mild    Chronic back pain                                             IBS (irritable bowel syndrome)                                PTSD (post-traumatic stress disorder)                         Headache(784.0)                                               Menorrhagia                                                   Cervical dysplasia                                            Uterine prolapse                                              CRISTY (stress urinary incontinence, female)                     Thyroid nodule                                  10/3/2019     Essential hypertension                                        PAST SURGICAL HX:    HIP ARTHROSCOPY W/ LABRAL DEBRIDEMENT           09/11/2012    ESOPHAGOGASTRODUODENOSCOPY TRANSORAL FLEX W/BX* 02/07/2012    COLONOSCOPY W BIOPSY                            08/06/2010    BIOPSY OF THYROID,PERCUT                        02/25/2010    OCCLUDE FALLOPIAN TUBE BY DEVICE                01/27/2010    LAPAROSCOPY, CHOLECYSTECTOMY                    01/01/2009    VAGINAL HYSTERECTOMY                            03/30/2011    URETHRAL SLING                                  03/30/2011    COLPOSCOPY,LOOP ELECTRD CERVIX EXCIS            03/28/2011     COLPOSCOPY                                      2010    Family History   Problem Relation Age of Onset   • Hypertension Mother    • High cholesterol Mother    • Hypertension Father    • High cholesterol Father    • Cancer Sister         Hodgkins disease     Review of patient's family status indicates:    Mother                         Alive                     Father                         Alive                     Sister                         Alive                       Social History     Socioeconomic History   • Marital status: Single     Spouse name: Not on file   • Number of children: 2   • Years of education: Not on file   • Highest education level: Not on file   Occupational History   • Occupation: Stay at Home Mom   Tobacco Use   • Smoking status: Former Smoker     Packs/day: 0.10     Years: 23.00     Pack years: 2.30     Types: Cigarettes     Start date: 1990     Quit date: 2019     Years since quittin.7   • Smokeless tobacco: Never Used   • Tobacco comment: 2019-Declined quitline information   Substance and Sexual Activity   • Alcohol use: No   • Drug use: No   • Sexual activity: Yes     Partners: Male     Birth control/protection: Surgical   Other Topics Concern   • Not on file   Social History Narrative   • Not on file     Social Determinants of Health     Financial Resource Strain: Not on file   Food Insecurity: Not on file   Transportation Needs:    • Lack of Transportation (Medical):    • Lack of Transportation (Non-Medical):    Physical Activity:    • Days of Exercise per Week:    • Minutes of Exercise per Session:    Stress: Not on file   Social Connections:    • Social Determinants: Social Connections:    Intimate Partner Violence: Not At Risk   • Social Determinants: Intimate Partner Violence Past Fear: 1   • Social Determinants: Intimate Partner Violence Current Fear: 1     On physical examination she is a 43 year female.  Visit Vitals  /87   Pulse 78   Ht 5'  7.5\" (1.715 m)   Wt 95.9 kg   BMI 32.62 kg/m²     SKIN:  Warm and hydrated without evidence of skin rashes, abnormal lesions, or palpable skin abnormalities.   HEENT:  Unremarkable.   CHEST:  Clear to auscultation with no wheezing or rales, normal to percussion.   HEART:  S1, S2, regular, rhythmic, with no murmur or rub.   ABDOMEN:  Soft, nontender, nondistended, with normal bowel sounds.  No evidence of hepatosplenomegaly.   EXTREMITIES:  No pedal edema and normal peripheral pulses.   There is evidence of an ingrown toenail over the medial aspect of the left great toe.Examination her breasts reveals no evidence of palpable abnormalities or supraclavicular/axillary lymphadenopathy.    Assessment:    1. Annual physical exam    2. Anxiety associated with depression    3. Primary insomnia    4. Essential hypertension    5. Resistant hypertension    6. Irritable bowel syndrome with both constipation and diarrhea    7. Thyroid nodule    8. Chronic diarrhea    9. Hypovitaminosis D    10. Ingrown nail      Plan:    After reviewing the risks and benefits of procedure, the patient gave consent for removal of the ingrown toenail.  Using sterile technique and local anesthesia, the ingrown part of the toenail was removed without any difficulty using a nail clipper and a hemostat.  Bleeding was controlled with pressure.  Phenol treatment was done once in attempt to prevent a recurrence.  Post procedure instructions were provided.  The patient have additional blood tests done today and she will receive a tetanus vaccine.  She will be referred for a colonoscopy for evaluation of chronic diarrhea and to rule out lymphocytic colitis.   frequent/aching

## 2025-04-01 NOTE — ASU PATIENT PROFILE, ADULT - NS PREOP UNDERSTANDS INFO
NPO/NO solid foods after 12Midnight. water  apple juice till  6-7 am  , dress comfortable, no lotions, no jewelry, Bring ID photo and insurance cards, escort arranged, address  and telephone given/yes

## 2025-04-02 ENCOUNTER — TRANSCRIPTION ENCOUNTER (OUTPATIENT)
Age: 55
End: 2025-04-02

## 2025-04-02 ENCOUNTER — APPOINTMENT (OUTPATIENT)
Dept: ORTHOPEDIC SURGERY | Facility: AMBULATORY SURGERY CENTER | Age: 55
End: 2025-04-02

## 2025-04-02 ENCOUNTER — OUTPATIENT (OUTPATIENT)
Dept: OUTPATIENT SERVICES | Facility: HOSPITAL | Age: 55
LOS: 1 days | Discharge: HOME | End: 2025-04-02
Payer: COMMERCIAL

## 2025-04-02 VITALS
SYSTOLIC BLOOD PRESSURE: 155 MMHG | WEIGHT: 180.78 LBS | RESPIRATION RATE: 16 BRPM | TEMPERATURE: 98 F | HEIGHT: 65 IN | OXYGEN SATURATION: 99 % | HEART RATE: 71 BPM | DIASTOLIC BLOOD PRESSURE: 101 MMHG

## 2025-04-02 VITALS
RESPIRATION RATE: 15 BRPM | DIASTOLIC BLOOD PRESSURE: 87 MMHG | OXYGEN SATURATION: 98 % | SYSTOLIC BLOOD PRESSURE: 144 MMHG | HEART RATE: 72 BPM

## 2025-04-02 DIAGNOSIS — Z98.891 HISTORY OF UTERINE SCAR FROM PREVIOUS SURGERY: Chronic | ICD-10-CM

## 2025-04-02 DIAGNOSIS — L05.91 PILONIDAL CYST WITHOUT ABSCESS: Chronic | ICD-10-CM

## 2025-04-02 DIAGNOSIS — Z90.89 ACQUIRED ABSENCE OF OTHER ORGANS: Chronic | ICD-10-CM

## 2025-04-02 DIAGNOSIS — Z98.1 ARTHRODESIS STATUS: Chronic | ICD-10-CM

## 2025-04-02 DIAGNOSIS — Z90.3 ACQUIRED ABSENCE OF STOMACH [PART OF]: Chronic | ICD-10-CM

## 2025-04-02 DIAGNOSIS — Z98.890 OTHER SPECIFIED POSTPROCEDURAL STATES: Chronic | ICD-10-CM

## 2025-04-02 PROCEDURE — 29827 SHO ARTHRS SRG RT8TR CUF RPR: CPT | Mod: LT

## 2025-04-02 PROCEDURE — 29823 SHO ARTHRS SRG XTNSV DBRDMT: CPT | Mod: LT

## 2025-04-02 RX ORDER — SODIUM CHLORIDE 9 G/1000ML
500 INJECTION, SOLUTION INTRAVENOUS ONCE
Refills: 0 | Status: COMPLETED | OUTPATIENT
Start: 2025-04-02 | End: 2025-04-02

## 2025-04-02 RX ORDER — FENTANYL CITRATE-0.9 % NACL/PF 100MCG/2ML
25 SYRINGE (ML) INTRAVENOUS
Refills: 0 | Status: DISCONTINUED | OUTPATIENT
Start: 2025-04-02 | End: 2025-04-02

## 2025-04-02 RX ORDER — SODIUM CHLORIDE 9 G/1000ML
1000 INJECTION, SOLUTION INTRAVENOUS
Refills: 0 | Status: DISCONTINUED | OUTPATIENT
Start: 2025-04-02 | End: 2025-04-02

## 2025-04-02 RX ORDER — HYDROCHLOROTHIAZIDE 50 MG/1
1 TABLET ORAL
Refills: 0 | DISCHARGE

## 2025-04-02 RX ORDER — APREPITANT 40 MG/1
40 CAPSULE ORAL ONCE
Refills: 0 | Status: COMPLETED | OUTPATIENT
Start: 2025-04-02 | End: 2025-04-02

## 2025-04-02 RX ADMIN — Medication 1 APPLICATION(S): at 14:00

## 2025-04-02 RX ADMIN — SODIUM CHLORIDE 1000 MILLILITER(S): 9 INJECTION, SOLUTION INTRAVENOUS at 15:31

## 2025-04-02 RX ADMIN — APREPITANT 40 MILLIGRAM(S): 40 CAPSULE ORAL at 15:43

## 2025-04-02 NOTE — PRE-ANESTHESIA EVALUATION ADULT - NSANTHADDINFOFT_GEN_ALL_CORE
GA  Had long d/w pt regarding risks of PNB and MS. Patient would like to proceed without PNB and only do GA  R/B/A d/w patient. All questions answered

## 2025-04-02 NOTE — ASU DISCHARGE PLAN (ADULT/PEDIATRIC) - FINANCIAL ASSISTANCE
[facility] provides services at a reduced cost to those who are determined to be eligible through [facility]’s financial assistance program. Information regarding [facility]’s financial assistance program can be found by going to [email or web address] or by calling [phone].

## 2025-04-02 NOTE — ASU DISCHARGE PLAN (ADULT/PEDIATRIC) - ASU DC SPECIAL INSTRUCTIONSFT
Wear sling at all times including sleep except to move elbow intermittently. No shoulder motion.  Keep incisions clean dry and covered with bandaids or gauze.

## 2025-04-02 NOTE — BRIEF OPERATIVE NOTE - NSICDXBRIEFPREOP_GEN_ALL_CORE_FT
PRE-OP DIAGNOSIS:  Traumatic complete tear of left rotator cuff, initial encounter 02-Apr-2025 13:54:04  Florinda Soto  Glenohumeral arthritis, left 02-Apr-2025 13:54:22  Florinda Soto  Tendinopathy of left biceps tendon 02-Apr-2025 13:54:56  Florinda Soto   PRE-OP DIAGNOSIS:  Complete rotator cuff tear of left shoulder 02-Apr-2025 13:53:14  Florinda Soto  Tendinopathy of left biceps tendon 02-Apr-2025 13:54:56  Florinda Soto

## 2025-04-02 NOTE — ASU DISCHARGE PLAN (ADULT/PEDIATRIC) - CARE PROVIDER_API CALL
Florinda Soto  Orthopaedic Surgery  [address]  [place] [address]  Phone: [phone]  Fax: [phone]  Established Patient  Follow Up Time:

## 2025-04-02 NOTE — ASU DISCHARGE PLAN (ADULT/PEDIATRIC) - NS MD DC FALL RISK RISK
For information on Fall & Injury Prevention, visit: [email or web address] OR  [email or web address] OR  https://www.cdc.gov/steadi/patient.html

## 2025-04-02 NOTE — BRIEF OPERATIVE NOTE - NSICDXBRIEFPROCEDURE_GEN_ALL_CORE_FT
PROCEDURES:  Repair, rotator cuff, arthroscopic 02-Apr-2025 19:58:33  Mark Quinteros   PROCEDURES:  Repair, rotator cuff, arthroscopic 02-Apr-2025 19:58:33  Mark Quinteros  Tenotomy, biceps 02-Apr-2025 20:00:20  Mark Quinteros  Removal, loose body, shoulder 02-Apr-2025 20:00:39  Mark Quinteros

## 2025-04-10 ENCOUNTER — APPOINTMENT (OUTPATIENT)
Dept: ORTHOPEDIC SURGERY | Facility: CLINIC | Age: 55
End: 2025-04-10
Payer: COMMERCIAL

## 2025-04-10 DIAGNOSIS — M75.42 IMPINGEMENT SYNDROME OF LEFT SHOULDER: ICD-10-CM

## 2025-04-10 DIAGNOSIS — M67.814 OTHER SPECIFIED DISORDERS OF TENDON, LEFT SHOULDER: ICD-10-CM

## 2025-04-11 ENCOUNTER — APPOINTMENT (OUTPATIENT)
Dept: ORTHOPEDIC SURGERY | Facility: CLINIC | Age: 55
End: 2025-04-11
Payer: COMMERCIAL

## 2025-04-11 DIAGNOSIS — M67.922 UNSPECIFIED DISORDER OF SYNOVIUM AND TENDON, LEFT UPPER ARM: ICD-10-CM

## 2025-04-11 DIAGNOSIS — B36.9 SUPERFICIAL MYCOSIS, UNSPECIFIED: ICD-10-CM

## 2025-04-11 DIAGNOSIS — M19.012 PRIMARY OSTEOARTHRITIS, LEFT SHOULDER: ICD-10-CM

## 2025-04-11 DIAGNOSIS — S46.012D STRAIN OF MUSCLE(S) AND TENDON(S) OF THE ROTATOR CUFF OF LEFT SHOULDER, SUBSEQUENT ENCOUNTER: ICD-10-CM

## 2025-04-11 DIAGNOSIS — L03.114 CELLULITIS OF LEFT UPPER LIMB: ICD-10-CM

## 2025-04-11 PROCEDURE — 99024 POSTOP FOLLOW-UP VISIT: CPT

## 2025-04-11 RX ORDER — NYSTATIN 100000 [USP'U]/G
100000 CREAM TOPICAL TWICE DAILY
Qty: 1 | Refills: 0 | Status: ACTIVE | COMMUNITY
Start: 2025-04-11 | End: 1900-01-01

## 2025-04-11 RX ORDER — CEPHALEXIN 500 MG/1
500 CAPSULE ORAL 3 TIMES DAILY
Qty: 21 | Refills: 0 | Status: ACTIVE | COMMUNITY
Start: 2025-04-11 | End: 1900-01-01

## 2025-04-30 ENCOUNTER — APPOINTMENT (OUTPATIENT)
Dept: ORTHOPEDIC SURGERY | Facility: AMBULATORY SURGERY CENTER | Age: 55
End: 2025-04-30

## 2025-05-08 ENCOUNTER — APPOINTMENT (OUTPATIENT)
Dept: ORTHOPEDIC SURGERY | Facility: CLINIC | Age: 55
End: 2025-05-08

## 2025-05-09 ENCOUNTER — APPOINTMENT (OUTPATIENT)
Dept: ORTHOPEDIC SURGERY | Facility: CLINIC | Age: 55
End: 2025-05-09

## 2025-05-22 ENCOUNTER — NON-APPOINTMENT (OUTPATIENT)
Age: 55
End: 2025-05-22

## 2025-05-30 ENCOUNTER — APPOINTMENT (OUTPATIENT)
Dept: ORTHOPEDIC SURGERY | Facility: CLINIC | Age: 55
End: 2025-05-30
Payer: COMMERCIAL

## 2025-05-30 DIAGNOSIS — S46.012D STRAIN OF MUSCLE(S) AND TENDON(S) OF THE ROTATOR CUFF OF LEFT SHOULDER, SUBSEQUENT ENCOUNTER: ICD-10-CM

## 2025-05-30 DIAGNOSIS — M75.102 UNSPECIFIED ROTATOR CUFF TEAR OR RUPTURE OF LEFT SHOULDER, NOT SPECIFIED AS TRAUMATIC: ICD-10-CM

## 2025-05-30 DIAGNOSIS — M67.814 OTHER SPECIFIED DISORDERS OF TENDON, LEFT SHOULDER: ICD-10-CM

## 2025-05-30 DIAGNOSIS — M67.922 UNSPECIFIED DISORDER OF SYNOVIUM AND TENDON, LEFT UPPER ARM: ICD-10-CM

## 2025-05-30 DIAGNOSIS — M19.012 PRIMARY OSTEOARTHRITIS, LEFT SHOULDER: ICD-10-CM

## 2025-05-30 PROCEDURE — 99024 POSTOP FOLLOW-UP VISIT: CPT

## 2025-07-09 ENCOUNTER — NON-APPOINTMENT (OUTPATIENT)
Age: 55
End: 2025-07-09

## 2025-07-10 ENCOUNTER — APPOINTMENT (OUTPATIENT)
Dept: ORTHOPEDIC SURGERY | Facility: CLINIC | Age: 55
End: 2025-07-10
Payer: COMMERCIAL

## 2025-07-10 PROCEDURE — 99213 OFFICE O/P EST LOW 20 MIN: CPT

## 2025-08-21 ENCOUNTER — APPOINTMENT (OUTPATIENT)
Dept: ORTHOPEDIC SURGERY | Facility: CLINIC | Age: 55
End: 2025-08-21

## 2025-08-21 ENCOUNTER — APPOINTMENT (OUTPATIENT)
Dept: ORTHOPEDIC SURGERY | Facility: CLINIC | Age: 55
End: 2025-08-21
Payer: COMMERCIAL

## 2025-08-21 DIAGNOSIS — M19.012 PRIMARY OSTEOARTHRITIS, LEFT SHOULDER: ICD-10-CM

## 2025-08-21 DIAGNOSIS — M75.102 UNSPECIFIED ROTATOR CUFF TEAR OR RUPTURE OF LEFT SHOULDER, NOT SPECIFIED AS TRAUMATIC: ICD-10-CM

## 2025-08-21 PROCEDURE — 99213 OFFICE O/P EST LOW 20 MIN: CPT | Mod: 95
